# Patient Record
Sex: FEMALE | Race: WHITE | Employment: UNEMPLOYED | URBAN - METROPOLITAN AREA
[De-identification: names, ages, dates, MRNs, and addresses within clinical notes are randomized per-mention and may not be internally consistent; named-entity substitution may affect disease eponyms.]

---

## 2017-06-15 PROBLEM — Z92.21 STATUS POST CHEMOTHERAPY: Status: ACTIVE | Noted: 2017-06-15

## 2017-06-15 PROBLEM — C50.919 BREAST CANCER (HCC): Status: ACTIVE | Noted: 2017-06-15

## 2017-06-15 PROBLEM — E72.12 MTHFR (METHYLENE THF REDUCTASE) DEFICIENCY AND HOMOCYSTINURIA (HCC): Status: ACTIVE | Noted: 2017-06-15

## 2017-06-15 PROBLEM — T82.7XXA: Status: ACTIVE | Noted: 2017-06-15

## 2017-06-15 PROBLEM — E72.11 MTHFR (METHYLENE THF REDUCTASE) DEFICIENCY AND HOMOCYSTINURIA (HCC): Status: ACTIVE | Noted: 2017-06-15

## 2017-06-15 PROBLEM — L03.90 CELLULITIS: Status: ACTIVE | Noted: 2017-06-15

## 2017-07-25 LAB
PAP SMEAR, EXTERNAL: NEGATIVE
PAP SMEAR, EXTERNAL: NORMAL

## 2017-07-27 LAB — PAP SMEAR, EXTERNAL: NEGATIVE

## 2018-03-14 ENCOUNTER — TELEPHONE (OUTPATIENT)
Dept: FAMILY MEDICINE CLINIC | Age: 53
End: 2018-03-14

## 2018-03-21 ENCOUNTER — OFFICE VISIT (OUTPATIENT)
Dept: FAMILY MEDICINE CLINIC | Age: 53
End: 2018-03-21
Payer: COMMERCIAL

## 2018-03-21 VITALS — BODY MASS INDEX: 27.74 KG/M2 | WEIGHT: 183 LBS | HEIGHT: 68 IN

## 2018-03-21 DIAGNOSIS — M25.511 ACUTE PAIN OF RIGHT SHOULDER: Primary | ICD-10-CM

## 2018-03-21 DIAGNOSIS — R07.81 RIB PAIN ON LEFT SIDE: ICD-10-CM

## 2018-03-21 DIAGNOSIS — Z85.3 HX OF BREAST CANCER: ICD-10-CM

## 2018-03-21 PROCEDURE — 99214 OFFICE O/P EST MOD 30 MIN: CPT | Performed by: FAMILY MEDICINE

## 2018-03-21 RX ORDER — EXEMESTANE 25 MG/1
TABLET ORAL
COMMUNITY
Start: 2018-03-20 | End: 2018-05-21 | Stop reason: ALTCHOICE

## 2018-03-21 RX ORDER — ANASTROZOLE 1 MG/1
TABLET ORAL
Refills: 11 | COMMUNITY
Start: 2018-02-09 | End: 2018-03-21

## 2018-03-21 ASSESSMENT — PATIENT HEALTH QUESTIONNAIRE - PHQ9
1. LITTLE INTEREST OR PLEASURE IN DOING THINGS: 1
SUM OF ALL RESPONSES TO PHQ9 QUESTIONS 1 & 2: 2
SUM OF ALL RESPONSES TO PHQ QUESTIONS 1-9: 2
2. FEELING DOWN, DEPRESSED OR HOPELESS: 1

## 2018-03-21 NOTE — PROGRESS NOTES
Subjective:      Patient ID: Shawna Lynne is a 50 y.o. female.     HPI    Review of Systems    Objective:   Physical Exam    Assessment:      ***      Plan:      ***

## 2018-04-05 ENCOUNTER — TELEPHONE (OUTPATIENT)
Dept: FAMILY MEDICINE CLINIC | Age: 53
End: 2018-04-05

## 2018-05-21 ENCOUNTER — OFFICE VISIT (OUTPATIENT)
Dept: FAMILY MEDICINE CLINIC | Age: 53
End: 2018-05-21
Payer: COMMERCIAL

## 2018-05-21 VITALS
WEIGHT: 189 LBS | HEIGHT: 68 IN | BODY MASS INDEX: 28.64 KG/M2 | OXYGEN SATURATION: 98 % | HEART RATE: 82 BPM | DIASTOLIC BLOOD PRESSURE: 80 MMHG | SYSTOLIC BLOOD PRESSURE: 120 MMHG | RESPIRATION RATE: 16 BRPM

## 2018-05-21 DIAGNOSIS — C50.919 MALIGNANT NEOPLASM OF FEMALE BREAST, UNSPECIFIED ESTROGEN RECEPTOR STATUS, UNSPECIFIED LATERALITY, UNSPECIFIED SITE OF BREAST (HCC): ICD-10-CM

## 2018-05-21 DIAGNOSIS — M75.01 ADHESIVE CAPSULITIS OF RIGHT SHOULDER: Primary | ICD-10-CM

## 2018-05-21 DIAGNOSIS — R07.89 LEFT-SIDED CHEST WALL PAIN: ICD-10-CM

## 2018-05-21 PROCEDURE — 99213 OFFICE O/P EST LOW 20 MIN: CPT | Performed by: FAMILY MEDICINE

## 2018-05-21 RX ORDER — NERATINIB 40 MG/1
TABLET ORAL
Refills: 2 | COMMUNITY
Start: 2018-05-10 | End: 2019-01-04 | Stop reason: CLARIF

## 2018-05-21 ASSESSMENT — ENCOUNTER SYMPTOMS
SINUS PRESSURE: 0
ABDOMINAL PAIN: 0
WHEEZING: 0
SHORTNESS OF BREATH: 0

## 2018-06-13 ENCOUNTER — HOSPITAL ENCOUNTER (OUTPATIENT)
Age: 53
Discharge: HOME | End: 2018-06-13
Payer: COMMERCIAL

## 2018-06-13 LAB
ALBUMIN SERPL-MCNC: 4.3 G/DL (ref 3.5–5.2)
ALP BLD-CCNC: 105 U/L (ref 35–104)
ALT SERPL-CCNC: 8 U/L (ref 0–32)
ANION GAP SERPL CALCULATED.3IONS-SCNC: 10 MMOL/L (ref 7–16)
AST SERPL-CCNC: 15 U/L (ref 0–31)
BASOPHILS ABSOLUTE: 0.02 E9/L (ref 0–0.2)
BASOPHILS RELATIVE PERCENT: 0.3 % (ref 0–2)
BILIRUB SERPL-MCNC: 0.2 MG/DL (ref 0–1.2)
BUN BLDV-MCNC: 17 MG/DL (ref 6–20)
CALCIUM SERPL-MCNC: 9.9 MG/DL (ref 8.6–10.2)
CHLORIDE BLD-SCNC: 102 MMOL/L (ref 98–107)
CO2: 31 MMOL/L (ref 22–29)
CREAT SERPL-MCNC: 1.3 MG/DL (ref 0.5–1)
EOSINOPHILS ABSOLUTE: 0.13 E9/L (ref 0.05–0.5)
EOSINOPHILS RELATIVE PERCENT: 1.7 % (ref 0–6)
GFR AFRICAN AMERICAN: 52
GFR NON-AFRICAN AMERICAN: 43 ML/MIN/1.73
GLUCOSE BLD-MCNC: 90 MG/DL (ref 74–109)
HCT VFR BLD CALC: 36.4 % (ref 34–48)
HEMOGLOBIN: 11.8 G/DL (ref 11.5–15.5)
IMMATURE GRANULOCYTES #: 0.02 E9/L
IMMATURE GRANULOCYTES %: 0.3 % (ref 0–5)
LYMPHOCYTES ABSOLUTE: 1.17 E9/L (ref 1.5–4)
LYMPHOCYTES RELATIVE PERCENT: 15.3 % (ref 20–42)
MAGNESIUM: 2.2 MG/DL (ref 1.6–2.6)
MCH RBC QN AUTO: 27.8 PG (ref 26–35)
MCHC RBC AUTO-ENTMCNC: 32.4 % (ref 32–34.5)
MCV RBC AUTO: 85.6 FL (ref 80–99.9)
MONOCYTES ABSOLUTE: 0.51 E9/L (ref 0.1–0.95)
MONOCYTES RELATIVE PERCENT: 6.7 % (ref 2–12)
NEUTROPHILS ABSOLUTE: 5.78 E9/L (ref 1.8–7.3)
NEUTROPHILS RELATIVE PERCENT: 75.7 % (ref 43–80)
PDW BLD-RTO: 13.7 FL (ref 11.5–15)
PLATELET # BLD: 297 E9/L (ref 130–450)
PMV BLD AUTO: 8.7 FL (ref 7–12)
POTASSIUM SERPL-SCNC: 4.8 MMOL/L (ref 3.5–5)
RBC # BLD: 4.25 E12/L (ref 3.5–5.5)
SODIUM BLD-SCNC: 143 MMOL/L (ref 132–146)
TOTAL PROTEIN: 7 G/DL (ref 6.4–8.3)
WBC # BLD: 7.6 E9/L (ref 4.5–11.5)

## 2018-06-13 PROCEDURE — 83735 ASSAY OF MAGNESIUM: CPT

## 2018-06-13 PROCEDURE — 80053 COMPREHEN METABOLIC PANEL: CPT

## 2018-06-13 PROCEDURE — 36415 COLL VENOUS BLD VENIPUNCTURE: CPT

## 2018-06-13 PROCEDURE — 85025 COMPLETE CBC W/AUTO DIFF WBC: CPT

## 2018-07-24 ENCOUNTER — TELEPHONE (OUTPATIENT)
Dept: FAMILY MEDICINE CLINIC | Age: 53
End: 2018-07-24

## 2018-07-24 DIAGNOSIS — C50.919 MALIGNANT NEOPLASM OF FEMALE BREAST, UNSPECIFIED ESTROGEN RECEPTOR STATUS, UNSPECIFIED LATERALITY, UNSPECIFIED SITE OF BREAST (HCC): Primary | ICD-10-CM

## 2018-08-27 ENCOUNTER — OFFICE VISIT (OUTPATIENT)
Dept: FAMILY MEDICINE CLINIC | Age: 53
End: 2018-08-27
Payer: COMMERCIAL

## 2018-08-27 VITALS
WEIGHT: 191 LBS | RESPIRATION RATE: 16 BRPM | HEIGHT: 68 IN | BODY MASS INDEX: 28.95 KG/M2 | OXYGEN SATURATION: 98 % | HEART RATE: 79 BPM | SYSTOLIC BLOOD PRESSURE: 118 MMHG | DIASTOLIC BLOOD PRESSURE: 65 MMHG

## 2018-08-27 DIAGNOSIS — C50.919 MALIGNANT NEOPLASM OF FEMALE BREAST, UNSPECIFIED ESTROGEN RECEPTOR STATUS, UNSPECIFIED LATERALITY, UNSPECIFIED SITE OF BREAST (HCC): ICD-10-CM

## 2018-08-27 DIAGNOSIS — K58.9 IRRITABLE BOWEL SYNDROME, UNSPECIFIED TYPE: ICD-10-CM

## 2018-08-27 DIAGNOSIS — M75.01 ADHESIVE CAPSULITIS OF RIGHT SHOULDER: ICD-10-CM

## 2018-08-27 DIAGNOSIS — Z12.11 SCREENING FOR MALIGNANT NEOPLASM OF COLON: Primary | ICD-10-CM

## 2018-08-27 PROCEDURE — 99213 OFFICE O/P EST LOW 20 MIN: CPT | Performed by: FAMILY MEDICINE

## 2018-08-27 PROCEDURE — 20610 DRAIN/INJ JOINT/BURSA W/O US: CPT | Performed by: FAMILY MEDICINE

## 2018-08-27 RX ORDER — VITAMIN B COMPLEX
1 CAPSULE ORAL DAILY
COMMUNITY
End: 2021-09-10 | Stop reason: ALTCHOICE

## 2018-08-27 RX ORDER — ANASTROZOLE 1 MG/1
TABLET ORAL
COMMUNITY
Start: 2017-11-14 | End: 2018-08-27

## 2018-08-27 RX ORDER — TAMOXIFEN CITRATE 10 MG/1
10 TABLET ORAL DAILY
COMMUNITY
End: 2020-02-13 | Stop reason: ALTCHOICE

## 2018-08-27 RX ORDER — EXEMESTANE 25 MG/1
TABLET ORAL
COMMUNITY
End: 2019-01-04 | Stop reason: CLARIF

## 2018-08-27 RX ORDER — GABAPENTIN 300 MG/1
CAPSULE ORAL
COMMUNITY
Start: 2018-07-30 | End: 2018-08-27

## 2018-08-27 RX ORDER — TRIAMCINOLONE ACETONIDE 40 MG/ML
40 INJECTION, SUSPENSION INTRA-ARTICULAR; INTRAMUSCULAR ONCE
Status: DISCONTINUED | OUTPATIENT
Start: 2018-08-27 | End: 2022-08-26

## 2018-08-27 ASSESSMENT — ENCOUNTER SYMPTOMS
ABDOMINAL PAIN: 0
WHEEZING: 0
SHORTNESS OF BREATH: 0
SINUS PRESSURE: 0

## 2018-08-27 NOTE — PROGRESS NOTES
2018     Toya Caballero (:  1965) is a 48 y.o. female, here for evaluation of the following medical concerns:    HPI  Presents for follow up   Tried gabapentin for nerve pain from chemo  Is better- stopped the gabapentin  Is sleeping okay   Still has limited rom in right arm-   Injection helped x 1 -- would like another today if possible     Hx of ibs, due for colonoscopy     Review of Systems   Constitutional: Negative for chills, fatigue and fever. HENT: Negative for congestion and sinus pressure. Respiratory: Negative for shortness of breath and wheezing. Cardiovascular: Negative for chest pain. Gastrointestinal: Negative for abdominal pain. Prior to Visit Medications    Medication Sig Taking? Authorizing Provider   exemestane (AROMASIN) 25 MG tablet Take by mouth Yes Historical Provider, MD   Glucosamine-Chondroitin 250-200 MG TABS Take by mouth Yes Historical Provider, MD   TRASTUZUMAB IV Infuse intravenously Yes Historical Provider, MD   tamoxifen (NOLVADEX) 10 MG tablet Take 10 mg by mouth daily Yes Historical Provider, MD   b complex vitamins capsule Take 1 capsule by mouth daily Yes Historical Provider, MD   vitamin D (CHOLECALCIFEROL) 1000 UNIT TABS tablet Take 1,000 Units by mouth daily Yes Historical Provider, MD   NERLYNX 40 MG TABS TAKE 6 TABLETS  240MG  BY MOUTH ONE TIME DAILY Yes Historical Provider, MD   cyanocobalamin 1000 MCG/ML injection Inject 1 mL into the muscle once for 1 dose Yes Kay Wang MD   Probiotic Product (SOLUBLE FIBER/PROBIOTICS PO) Take by mouth Yes Historical Provider, MD   MULTIPLE VITAMIN PO Take 1 tablet by mouth Last dose 16 Yes Historical Provider, MD   cetirizine (ZYRTEC) 10 MG tablet Take 10 mg by mouth daily.    Yes Historical Provider, MD        Social History   Substance Use Topics    Smoking status: Former Smoker     Quit date: 12/10/2002    Smokeless tobacco: Never Used      Comment: social    Alcohol use Yes Comment: social        Vitals:    08/27/18 1106   BP: 118/65   Pulse: 79   Resp: 16   SpO2: 98%   Weight: 191 lb (86.6 kg)   Height: 5' 8\" (1.727 m)     Estimated body mass index is 29.04 kg/m² as calculated from the following:    Height as of this encounter: 5' 8\" (1.727 m). Weight as of this encounter: 191 lb (86.6 kg). Physical Exam   Constitutional: She is oriented to person, place, and time. She appears well-developed and well-nourished. HENT:   Mouth/Throat: Oropharynx is clear and moist.   Cardiovascular: Normal rate, regular rhythm and normal heart sounds. Pulmonary/Chest: Effort normal and breath sounds normal.   Abdominal: Soft. Bowel sounds are normal. There is no tenderness. Neurological: She is alert and oriented to person, place, and time. Skin: Skin is warm and dry. ASSESSMENT/PLAN:  1. Screening for malignant neoplasm of colon  2. Irritable bowel syndrome, unspecified type  Options reviewed  Colonoscopy referral as below   - Eddie Woods MD ([facility])        3. Malignant neoplasm of female breast, unspecified estrogen receptor status, unspecified laterality, unspecified site of breast ([facility] [address]  4. Adhesive capsulitis of right shoulder  Procedure: We reviewed the procedure of joint injection and discussed the risks, benefits, and alternative treatments. Informed consent was obtained as outlined below. I verified that the patient had no allergies to local anesthetic. We discussed the potential side effects of corticosteroids, including but not limited to local tissue breakdown, elevation of blood sugar and seizures. A procedural pause was conducted to verify correct patient identity, procedure to be performed, correct side and site, and correct patient position. After verification, the right shoulder was marked and then prepped in the usual sterile fashion.  Using a 22 gauge 1.5 inch needle,2mL of lidocaine and 40 mg of kenalog was injected into the joint space without difficulty. After injection, the joint was passively moved through the full range of motion and a sterile dressing was applied. The patient tolerated the procedure well. Aftercare discussed. Plan to try pt again   - External Referral To Physical Therapy      3 month follow up   An electronic signature was used to authenticate this note.     --Cami Carrera MD on 8/27/2018 at 11:20 AM

## 2018-08-27 NOTE — LETTER
[address] [address]  Phone: [phone]  Fax: [phone]    Cirilo Bowden MD        August 27, 2018     Patient: Jennifer Magallanes   YOB: 1965   Date of Visit: 8/27/2018       To Whom It May Concern: It is my medical opinion that Laura Muñoz is unable to perform jury duty at this time due to medical reasons. If you have any questions or concerns, please don't hesitate to call.     Sincerely,        Cirilo Bowden MD

## 2018-09-14 LAB — FECAL BLOOD IMMUNOCHEMICAL TEST: NORMAL

## 2018-11-27 ENCOUNTER — TELEPHONE (OUTPATIENT)
Dept: ADMINISTRATIVE | Age: 53
End: 2018-11-27

## 2019-01-04 ENCOUNTER — OFFICE VISIT (OUTPATIENT)
Dept: FAMILY MEDICINE CLINIC | Age: 54
End: 2019-01-04
Payer: COMMERCIAL

## 2019-01-04 VITALS
SYSTOLIC BLOOD PRESSURE: 127 MMHG | OXYGEN SATURATION: 99 % | HEIGHT: 68 IN | BODY MASS INDEX: 30.31 KG/M2 | DIASTOLIC BLOOD PRESSURE: 70 MMHG | WEIGHT: 200 LBS | HEART RATE: 87 BPM | TEMPERATURE: 97.7 F | RESPIRATION RATE: 12 BRPM

## 2019-01-04 DIAGNOSIS — M79.7 FIBROMYALGIA: Primary | ICD-10-CM

## 2019-01-04 PROCEDURE — 99213 OFFICE O/P EST LOW 20 MIN: CPT | Performed by: FAMILY MEDICINE

## 2019-01-04 RX ORDER — AMITRIPTYLINE HYDROCHLORIDE 10 MG/1
10 TABLET, FILM COATED ORAL NIGHTLY
Qty: 90 TABLET | Refills: 1 | Status: SHIPPED | OUTPATIENT
Start: 2019-01-04 | End: 2019-02-21 | Stop reason: SINTOL

## 2019-02-20 ENCOUNTER — TELEPHONE (OUTPATIENT)
Dept: ADMINISTRATIVE | Age: 54
End: 2019-02-20

## 2019-02-21 ENCOUNTER — OFFICE VISIT (OUTPATIENT)
Dept: FAMILY MEDICINE CLINIC | Age: 54
End: 2019-02-21
Payer: COMMERCIAL

## 2019-02-21 VITALS
OXYGEN SATURATION: 99 % | WEIGHT: 199 LBS | DIASTOLIC BLOOD PRESSURE: 70 MMHG | SYSTOLIC BLOOD PRESSURE: 112 MMHG | BODY MASS INDEX: 30.16 KG/M2 | RESPIRATION RATE: 18 BRPM | HEIGHT: 68 IN | HEART RATE: 86 BPM

## 2019-02-21 DIAGNOSIS — M54.2 CERVICAL PAIN: Primary | ICD-10-CM

## 2019-02-21 PROCEDURE — 99213 OFFICE O/P EST LOW 20 MIN: CPT | Performed by: FAMILY MEDICINE

## 2019-02-21 RX ORDER — FAMOTIDINE 10 MG
10 TABLET ORAL 2 TIMES DAILY
COMMUNITY
End: 2021-09-10 | Stop reason: ALTCHOICE

## 2019-02-21 RX ORDER — CHLORAL HYDRATE 500 MG
1000 CAPSULE ORAL DAILY
COMMUNITY
End: 2021-09-10 | Stop reason: ALTCHOICE

## 2019-02-21 RX ORDER — ASCORBIC ACID 500 MG
500 TABLET ORAL DAILY
COMMUNITY
End: 2021-09-10 | Stop reason: ALTCHOICE

## 2019-02-21 RX ORDER — CYCLOBENZAPRINE HCL 5 MG
5 TABLET ORAL 2 TIMES DAILY PRN
Qty: 30 TABLET | Refills: 0 | Status: SHIPPED | OUTPATIENT
Start: 2019-02-21 | End: 2019-05-24 | Stop reason: SDUPTHER

## 2019-02-21 RX ORDER — VITAMIN E 268 MG
400 CAPSULE ORAL DAILY
COMMUNITY
End: 2021-09-10 | Stop reason: ALTCHOICE

## 2019-02-21 ASSESSMENT — PATIENT HEALTH QUESTIONNAIRE - PHQ9
1. LITTLE INTEREST OR PLEASURE IN DOING THINGS: 0
SUM OF ALL RESPONSES TO PHQ9 QUESTIONS 1 & 2: 0
2. FEELING DOWN, DEPRESSED OR HOPELESS: 0
SUM OF ALL RESPONSES TO PHQ QUESTIONS 1-9: 0
SUM OF ALL RESPONSES TO PHQ QUESTIONS 1-9: 0

## 2019-02-26 ENCOUNTER — HOSPITAL ENCOUNTER (OUTPATIENT)
Dept: PHYSICAL THERAPY | Age: 54
Setting detail: THERAPIES SERIES
Discharge: HOME | End: 2019-02-26
Payer: COMMERCIAL

## 2019-02-26 PROCEDURE — 97161 PT EVAL LOW COMPLEX 20 MIN: CPT

## 2019-05-24 DIAGNOSIS — M54.2 CERVICAL PAIN: ICD-10-CM

## 2019-05-24 RX ORDER — CYCLOBENZAPRINE HCL 5 MG
5 TABLET ORAL 2 TIMES DAILY PRN
Qty: 30 TABLET | Refills: 2 | Status: SHIPPED | OUTPATIENT
Start: 2019-05-24 | End: 2019-06-03

## 2020-02-13 ENCOUNTER — OFFICE VISIT (OUTPATIENT)
Dept: FAMILY MEDICINE CLINIC | Age: 55
End: 2020-02-13
Payer: COMMERCIAL

## 2020-02-13 VITALS
TEMPERATURE: 97.5 F | DIASTOLIC BLOOD PRESSURE: 62 MMHG | OXYGEN SATURATION: 98 % | HEART RATE: 94 BPM | RESPIRATION RATE: 18 BRPM | WEIGHT: 204 LBS | HEIGHT: 68 IN | SYSTOLIC BLOOD PRESSURE: 114 MMHG | BODY MASS INDEX: 30.92 KG/M2

## 2020-02-13 PROCEDURE — 99213 OFFICE O/P EST LOW 20 MIN: CPT | Performed by: FAMILY MEDICINE

## 2020-02-13 RX ORDER — BENZONATATE 100 MG/1
100-200 CAPSULE ORAL 3 TIMES DAILY PRN
Qty: 42 CAPSULE | Refills: 0 | Status: SHIPPED | OUTPATIENT
Start: 2020-02-13 | End: 2020-02-20

## 2020-02-13 ASSESSMENT — ENCOUNTER SYMPTOMS
SHORTNESS OF BREATH: 1
EYE REDNESS: 0
TROUBLE SWALLOWING: 0
VOMITING: 0
WHEEZING: 1
NAUSEA: 0
SINUS PRESSURE: 0
COUGH: 1
DIARRHEA: 0
ABDOMINAL PAIN: 0
EYE PAIN: 0
SINUS PAIN: 0
SORE THROAT: 0

## 2020-02-13 ASSESSMENT — PATIENT HEALTH QUESTIONNAIRE - PHQ9
2. FEELING DOWN, DEPRESSED OR HOPELESS: 0
SUM OF ALL RESPONSES TO PHQ QUESTIONS 1-9: 0
SUM OF ALL RESPONSES TO PHQ QUESTIONS 1-9: 0
1. LITTLE INTEREST OR PLEASURE IN DOING THINGS: 0
SUM OF ALL RESPONSES TO PHQ9 QUESTIONS 1 & 2: 0

## 2020-02-13 NOTE — PROGRESS NOTES
2/17/2020    Toya Arenas is a 48 y.o. femalehere for: cold symptoms    HPI:    Cough/congestion-  Symptoms started a couple days ago  Has a tight cough and congestion  + wheezing, + sob  + fever this morning 100.4  Her grandchild was sick recently  Has tried mucinex at home  Felt achy this morning    BP Readings from Last 3 Encounters:   02/13/20 114/62   02/21/19 112/70   01/04/19 127/70     Current Outpatient Medications   Medication Sig Dispense Refill    benzonatate (TESSALON) 100 MG capsule Take 1-2 capsules by mouth 3 times daily as needed for Cough 42 capsule 0    BIOTIN PO Take by mouth      vitamin C (ASCORBIC ACID) 500 MG tablet Take 500 mg by mouth daily      vitamin E 400 UNIT capsule Take 400 Units by mouth daily      famotidine (PEPCID) 10 MG tablet Take 10 mg by mouth 2 times daily      CALCIUM-MAGNESIUM-ZINC PO Take by mouth      Omega-3 Fatty Acids (FISH OIL) 1000 MG CAPS Take 1,000 mg by mouth daily      NONFORMULARY CBD Oil      b complex vitamins capsule Take 1 capsule by mouth daily      vitamin D (CHOLECALCIFEROL) 1000 UNIT TABS tablet Take 1,000 Units by mouth daily      Probiotic Product (SOLUBLE FIBER/PROBIOTICS PO) Take by mouth      MULTIPLE VITAMIN PO Take 1 tablet by mouth Last dose 5/13/16      cetirizine (ZYRTEC) 10 MG tablet Take 10 mg by mouth daily.          Current Facility-Administered Medications   Medication Dose Route Frequency Provider Last Rate Last Dose    triamcinolone acetonide (KENALOG-40) injection 40 mg  40 mg Intra-articular Once Facundo Coulter MD          Allergies   Allergen Reactions    Bactrim [Sulfamethoxazole-Trimethoprim] Hives and Itching    Pcn [Penicillins] Nausea And Vomiting       Past Medical & Surgical History:      Diagnosis Date    Breast cancer ([facility] [address] 02/2017    right     Breast disorder     FIBROCYSTIC    Cancer ([facility] [address]     GERD (gastroesophageal reflux disease)     IBS (irritable bowel syndrome)     MTHFR (methylene THF

## 2020-07-14 ENCOUNTER — TELEPHONE (OUTPATIENT)
Dept: FAMILY MEDICINE CLINIC | Age: 55
End: 2020-07-14

## 2020-08-27 ENCOUNTER — OFFICE VISIT (OUTPATIENT)
Dept: FAMILY MEDICINE CLINIC | Age: 55
End: 2020-08-27
Payer: COMMERCIAL

## 2020-08-27 VITALS
HEIGHT: 68 IN | BODY MASS INDEX: 31.07 KG/M2 | DIASTOLIC BLOOD PRESSURE: 78 MMHG | WEIGHT: 205 LBS | TEMPERATURE: 97.8 F | RESPIRATION RATE: 20 BRPM | HEART RATE: 86 BPM | SYSTOLIC BLOOD PRESSURE: 124 MMHG

## 2020-08-27 PROCEDURE — 99396 PREV VISIT EST AGE 40-64: CPT | Performed by: FAMILY MEDICINE

## 2020-08-27 RX ORDER — TAMOXIFEN CITRATE 10 MG/1
10 TABLET ORAL 2 TIMES DAILY
COMMUNITY
End: 2021-09-10 | Stop reason: ALTCHOICE

## 2020-08-27 ASSESSMENT — ENCOUNTER SYMPTOMS
CONSTIPATION: 0
SINUS PRESSURE: 0
ABDOMINAL PAIN: 0
COUGH: 0
SORE THROAT: 0
EYE PAIN: 0
BACK PAIN: 0
DIARRHEA: 0
SHORTNESS OF BREATH: 0

## 2020-08-27 NOTE — PROGRESS NOTES
connections     Talks on phone: None     Gets together: None     Attends Hindu service: None     Active member of club or organization: None     Attends meetings of clubs or organizations: None     Relationship status: None    Intimate partner violence     Fear of current or ex partner: None     Emotionally abused: None     Physically abused: None     Forced sexual activity: None   Other Topics Concern    None   Social History Narrative    None       Family History   Problem Relation Age of Onset    High Blood Pressure Mother     Heart Disease Mother         stent in 45s    Cancer Father         prostate    Substance Abuse Father         alcoholic    Cancer Sister 43        kidney    Cancer Sister         uterine precancer cells          Current Outpatient Medications   Medication Sig Dispense Refill    tamoxifen (NOLVADEX) 10 MG tablet Take 10 mg by mouth 2 times daily      BIOTIN PO Take by mouth      vitamin C (ASCORBIC ACID) 500 MG tablet Take 500 mg by mouth daily      vitamin E 400 UNIT capsule Take 400 Units by mouth daily      famotidine (PEPCID) 10 MG tablet Take 10 mg by mouth 2 times daily      CALCIUM-MAGNESIUM-ZINC PO Take by mouth      Omega-3 Fatty Acids (FISH OIL) 1000 MG CAPS Take 1,000 mg by mouth daily      NONFORMULARY CBD Oil      b complex vitamins capsule Take 1 capsule by mouth daily      vitamin D (CHOLECALCIFEROL) 1000 UNIT TABS tablet Take 1,000 Units by mouth daily      Probiotic Product (SOLUBLE FIBER/PROBIOTICS PO) Take by mouth      MULTIPLE VITAMIN PO Take 1 tablet by mouth Last dose 5/13/16      cetirizine (ZYRTEC) 10 MG tablet Take 10 mg by mouth daily.          Current Facility-Administered Medications   Medication Dose Route Frequency Provider Last Rate Last Dose    triamcinolone acetonide (KENALOG-40) injection 40 mg  40 mg Intra-articular Once Gavino Marinelli MD           Allergies   Allergen Reactions    Bactrim [Sulfamethoxazole-Trimethoprim] Hives and Itching    Pcn [Penicillins] Nausea And Vomiting       REVIEW OF SYSTEMS  Review of Systems   Constitutional: Negative for fatigue and fever. HENT: Negative for ear pain, sinus pressure, sneezing and sore throat. Eyes: Negative for pain. Respiratory: Negative for cough and shortness of breath. Cardiovascular: Negative for chest pain and leg swelling. Gastrointestinal: Negative for abdominal pain, constipation and diarrhea. Genitourinary: Negative for dysuria and urgency. Musculoskeletal: Negative for back pain and myalgias. Skin: Negative for rash. Allergic/Immunologic: Negative for food allergies. Neurological: Negative for light-headedness and headaches. Hematological: Does not bruise/bleed easily. Psychiatric/Behavioral: Negative for behavioral problems and sleep disturbance. PHYSICAL EXAM  /78   Pulse 86   Temp 97.8 °F (36.6 °C) (Temporal)   Resp 20   Ht 5' 8\" (1.727 m)   Wt 205 lb (93 kg)   LMP 09/09/2016   BMI 31.17 kg/m²   Physical Exam  Vitals signs and nursing note reviewed. Constitutional:       Appearance: She is well-developed. HENT:      Head: Normocephalic and atraumatic. Right Ear: External ear normal.      Left Ear: External ear normal.      Nose: Nose normal.   Eyes:      Conjunctiva/sclera: Conjunctivae normal.   Neck:      Musculoskeletal: Normal range of motion and neck supple. Thyroid: No thyromegaly. Cardiovascular:      Rate and Rhythm: Normal rate and regular rhythm. Heart sounds: Normal heart sounds. No murmur. No friction rub. No gallop. Pulmonary:      Effort: Pulmonary effort is normal.      Breath sounds: Normal breath sounds. No wheezing. Abdominal:      Palpations: Abdomen is soft. There is no mass. Tenderness: There is no abdominal tenderness. There is no guarding or rebound. Musculoskeletal: Normal range of motion. General: No tenderness.    Lymphadenopathy:      Cervical: No cervical vaccinations. Advised patient regarding importance of keeping up with recommended health maintenance and to schedule as soon as possible if overdue, as this isimportant in assessing for undiagnosed pathology, especially cancer, as well as protecting against potentially harmful/life threatening disease.          Patient and/or guardian verbalizes understanding andagrees with above counseling, assessment and plan.     All questions answered. Davina Elliott, DO  8/31/2020        NOTE: This report was transcribed usingvoice recognition software.  Every effort was made to ensure accuracy; however, inadvertent computerized transcription errors may be present

## 2020-09-09 ENCOUNTER — TELEPHONE (OUTPATIENT)
Dept: FAMILY MEDICINE CLINIC | Age: 55
End: 2020-09-09

## 2020-09-14 ENCOUNTER — HOSPITAL ENCOUNTER (OUTPATIENT)
Dept: GENERAL RADIOLOGY | Age: 55
Discharge: HOME | End: 2020-09-16
Payer: COMMERCIAL

## 2020-09-14 PROCEDURE — 77080 DXA BONE DENSITY AXIAL: CPT

## 2020-10-05 ENCOUNTER — TELEPHONE (OUTPATIENT)
Dept: FAMILY MEDICINE CLINIC | Age: 55
End: 2020-10-05

## 2020-10-05 NOTE — TELEPHONE ENCOUNTER
Pt would like to know th results of her Dexa that was done on 9/14/20 , please notify patient or she is asking if results can be faxed to her at [phone] or she can be reached at [phone]

## 2020-10-06 ENCOUNTER — HOSPITAL ENCOUNTER (OUTPATIENT)
Age: 55
Discharge: HOME | End: 2020-10-06
Payer: COMMERCIAL

## 2020-10-06 LAB
ALBUMIN SERPL-MCNC: 4.3 G/DL (ref 3.5–5.2)
ALP BLD-CCNC: 113 U/L (ref 35–104)
ALT SERPL-CCNC: 11 U/L (ref 0–32)
ANION GAP SERPL CALCULATED.3IONS-SCNC: 10 MMOL/L (ref 7–16)
AST SERPL-CCNC: 16 U/L (ref 0–31)
BASOPHILS ABSOLUTE: 0.05 E9/L (ref 0–0.2)
BASOPHILS RELATIVE PERCENT: 0.8 % (ref 0–2)
BILIRUB SERPL-MCNC: 0.4 MG/DL (ref 0–1.2)
BUN BLDV-MCNC: 15 MG/DL (ref 6–20)
CALCIUM SERPL-MCNC: 10.1 MG/DL (ref 8.6–10.2)
CHLORIDE BLD-SCNC: 106 MMOL/L (ref 98–107)
CHOLESTEROL, TOTAL: 208 MG/DL (ref 0–199)
CO2: 27 MMOL/L (ref 22–29)
CREAT SERPL-MCNC: 1.1 MG/DL (ref 0.5–1)
EOSINOPHILS ABSOLUTE: 0.24 E9/L (ref 0.05–0.5)
EOSINOPHILS RELATIVE PERCENT: 3.9 % (ref 0–6)
GFR AFRICAN AMERICAN: >60
GFR NON-AFRICAN AMERICAN: 52 ML/MIN/1.73
GLUCOSE BLD-MCNC: 95 MG/DL (ref 74–99)
HCT VFR BLD CALC: 39.7 % (ref 34–48)
HDLC SERPL-MCNC: 68 MG/DL
HEMOGLOBIN: 13.1 G/DL (ref 11.5–15.5)
IMMATURE GRANULOCYTES #: 0.02 E9/L
IMMATURE GRANULOCYTES %: 0.3 % (ref 0–5)
LDL CHOLESTEROL CALCULATED: 125 MG/DL (ref 0–99)
LYMPHOCYTES ABSOLUTE: 1.37 E9/L (ref 1.5–4)
LYMPHOCYTES RELATIVE PERCENT: 22.5 % (ref 20–42)
MCH RBC QN AUTO: 28.1 PG (ref 26–35)
MCHC RBC AUTO-ENTMCNC: 33 % (ref 32–34.5)
MCV RBC AUTO: 85 FL (ref 80–99.9)
MONOCYTES ABSOLUTE: 0.51 E9/L (ref 0.1–0.95)
MONOCYTES RELATIVE PERCENT: 8.4 % (ref 2–12)
NEUTROPHILS ABSOLUTE: 3.89 E9/L (ref 1.8–7.3)
NEUTROPHILS RELATIVE PERCENT: 64.1 % (ref 43–80)
PDW BLD-RTO: 14.1 FL (ref 11.5–15)
PLATELET # BLD: 327 E9/L (ref 130–450)
PMV BLD AUTO: 9 FL (ref 7–12)
POTASSIUM SERPL-SCNC: 5.2 MMOL/L (ref 3.5–5)
RBC # BLD: 4.67 E12/L (ref 3.5–5.5)
SODIUM BLD-SCNC: 143 MMOL/L (ref 132–146)
TOTAL PROTEIN: 7.4 G/DL (ref 6.4–8.3)
TRIGL SERPL-MCNC: 75 MG/DL (ref 0–149)
TSH SERPL DL<=0.05 MIU/L-ACNC: 2.68 UIU/ML (ref 0.27–4.2)
VITAMIN D 25-HYDROXY: 44 NG/ML (ref 30–100)
VLDLC SERPL CALC-MCNC: 15 MG/DL
WBC # BLD: 6.1 E9/L (ref 4.5–11.5)

## 2020-10-06 PROCEDURE — 85025 COMPLETE CBC W/AUTO DIFF WBC: CPT

## 2020-10-06 PROCEDURE — 80053 COMPREHEN METABOLIC PANEL: CPT

## 2020-10-06 PROCEDURE — 36415 COLL VENOUS BLD VENIPUNCTURE: CPT

## 2020-10-06 PROCEDURE — 84443 ASSAY THYROID STIM HORMONE: CPT

## 2020-10-06 PROCEDURE — 82306 VITAMIN D 25 HYDROXY: CPT

## 2020-10-06 PROCEDURE — 80061 LIPID PANEL: CPT

## 2021-06-14 ENCOUNTER — TELEPHONE (OUTPATIENT)
Dept: FAMILY MEDICINE CLINIC | Age: 56
End: 2021-06-14

## 2021-06-24 ENCOUNTER — HOSPITAL ENCOUNTER (OUTPATIENT)
Age: 56
Discharge: HOME | End: 2021-06-26

## 2021-06-24 PROCEDURE — 88305 TISSUE EXAM BY PATHOLOGIST: CPT

## 2021-06-24 PROCEDURE — 88302 TISSUE EXAM BY PATHOLOGIST: CPT

## 2021-06-25 ENCOUNTER — HOSPITAL ENCOUNTER (OUTPATIENT)
Age: 56
Discharge: HOME | End: 2021-06-27

## 2021-06-25 LAB
ANION GAP SERPL CALCULATED.3IONS-SCNC: 12 MMOL/L (ref 7–16)
BUN BLDV-MCNC: 13 MG/DL (ref 6–20)
CALCIUM SERPL-MCNC: 9.7 MG/DL (ref 8.6–10.2)
CHLORIDE BLD-SCNC: 103 MMOL/L (ref 98–107)
CO2: 25 MMOL/L (ref 22–29)
CREAT SERPL-MCNC: 0.9 MG/DL (ref 0.5–1)
GFR AFRICAN AMERICAN: >60
GFR NON-AFRICAN AMERICAN: >60 ML/MIN/1.73
GLUCOSE BLD-MCNC: 130 MG/DL (ref 74–99)
HCT VFR BLD CALC: 39.1 % (ref 34–48)
HEMOGLOBIN: 12 G/DL (ref 11.5–15.5)
MCH RBC QN AUTO: 26.3 PG (ref 26–35)
MCHC RBC AUTO-ENTMCNC: 30.7 % (ref 32–34.5)
MCV RBC AUTO: 85.7 FL (ref 80–99.9)
PDW BLD-RTO: 14.5 FL (ref 11.5–15)
PLATELET # BLD: 334 E9/L (ref 130–450)
PMV BLD AUTO: 10.6 FL (ref 7–12)
POTASSIUM SERPL-SCNC: 4.8 MMOL/L (ref 3.5–5)
RBC # BLD: 4.56 E12/L (ref 3.5–5.5)
SODIUM BLD-SCNC: 140 MMOL/L (ref 132–146)
WBC # BLD: 15.4 E9/L (ref 4.5–11.5)

## 2021-06-25 PROCEDURE — 85027 COMPLETE CBC AUTOMATED: CPT

## 2021-06-25 PROCEDURE — 80048 BASIC METABOLIC PNL TOTAL CA: CPT

## 2021-09-10 ENCOUNTER — OFFICE VISIT (OUTPATIENT)
Dept: FAMILY MEDICINE CLINIC | Age: 56
End: 2021-09-10
Payer: COMMERCIAL

## 2021-09-10 VITALS
TEMPERATURE: 97.3 F | HEART RATE: 80 BPM | RESPIRATION RATE: 18 BRPM | HEIGHT: 68 IN | SYSTOLIC BLOOD PRESSURE: 123 MMHG | BODY MASS INDEX: 31.83 KG/M2 | OXYGEN SATURATION: 98 % | WEIGHT: 210 LBS | DIASTOLIC BLOOD PRESSURE: 72 MMHG

## 2021-09-10 DIAGNOSIS — Z23 NEED FOR INFLUENZA VACCINATION: ICD-10-CM

## 2021-09-10 DIAGNOSIS — E55.9 VITAMIN D DEFICIENCY: ICD-10-CM

## 2021-09-10 DIAGNOSIS — Z12.11 COLON CANCER SCREENING: ICD-10-CM

## 2021-09-10 DIAGNOSIS — Z00.00 ANNUAL PHYSICAL EXAM: Primary | ICD-10-CM

## 2021-09-10 DIAGNOSIS — R53.83 FATIGUE, UNSPECIFIED TYPE: ICD-10-CM

## 2021-09-10 DIAGNOSIS — C50.919 MALIGNANT NEOPLASM OF FEMALE BREAST, UNSPECIFIED ESTROGEN RECEPTOR STATUS, UNSPECIFIED LATERALITY, UNSPECIFIED SITE OF BREAST (HCC): ICD-10-CM

## 2021-09-10 DIAGNOSIS — R73.01 IMPAIRED FASTING GLUCOSE: ICD-10-CM

## 2021-09-10 PROCEDURE — 90471 IMMUNIZATION ADMIN: CPT | Performed by: FAMILY MEDICINE

## 2021-09-10 PROCEDURE — 99396 PREV VISIT EST AGE 40-64: CPT | Performed by: FAMILY MEDICINE

## 2021-09-10 PROCEDURE — 90674 CCIIV4 VAC NO PRSV 0.5 ML IM: CPT | Performed by: FAMILY MEDICINE

## 2021-09-10 SDOH — ECONOMIC STABILITY: FOOD INSECURITY: WITHIN THE PAST 12 MONTHS, YOU WORRIED THAT YOUR FOOD WOULD RUN OUT BEFORE YOU GOT MONEY TO BUY MORE.: NEVER TRUE

## 2021-09-10 SDOH — ECONOMIC STABILITY: FOOD INSECURITY: WITHIN THE PAST 12 MONTHS, THE FOOD YOU BOUGHT JUST DIDN'T LAST AND YOU DIDN'T HAVE MONEY TO GET MORE.: NEVER TRUE

## 2021-09-10 ASSESSMENT — ENCOUNTER SYMPTOMS
SORE THROAT: 0
ABDOMINAL PAIN: 0
SINUS PRESSURE: 0
CONSTIPATION: 0
EYE PAIN: 0
SHORTNESS OF BREATH: 0
BACK PAIN: 0
DIARRHEA: 0
COUGH: 0

## 2021-09-10 ASSESSMENT — PATIENT HEALTH QUESTIONNAIRE - PHQ9
1. LITTLE INTEREST OR PLEASURE IN DOING THINGS: 0
2. FEELING DOWN, DEPRESSED OR HOPELESS: 0
SUM OF ALL RESPONSES TO PHQ QUESTIONS 1-9: 0
SUM OF ALL RESPONSES TO PHQ QUESTIONS 1-9: 0
SUM OF ALL RESPONSES TO PHQ9 QUESTIONS 1 & 2: 0
SUM OF ALL RESPONSES TO PHQ QUESTIONS 1-9: 0

## 2021-09-10 ASSESSMENT — SOCIAL DETERMINANTS OF HEALTH (SDOH): HOW HARD IS IT FOR YOU TO PAY FOR THE VERY BASICS LIKE FOOD, HOUSING, MEDICAL CARE, AND HEATING?: NOT HARD AT ALL

## 2021-09-10 NOTE — PROGRESS NOTES
Josh Sahni  : 1965    Chief Complaint:     Chief Complaint   Patient presents with    Annual Exam     go over blood work and wants hormone levels checked       HPI  Toya Cabrera 49 y.o. presents for   Chief Complaint   Patient presents with    Annual Exam     go over blood work and wants hormone levels checked     Patient presents for annual physical.  She states she has been doing well overall. However she cannot tolerate the antiestrogen medication for her breast CA. She is considering getting a second opinion. They are considering injections instead of the tamoxifen or Arimidex. She also has been fatigued lately. She does wish for some blood work. She did have surgery in  her sugar was slightly high as well as her white count. She does wish for flu shot today. All questions were answered to patients satisfaction.     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 Sky Clarke, COLON, DIAGNOSTIC  16    egd    MASTECTOMY Right     TUNNELED VENOUS CATHETER PLACEMENT      VASCULAR SURGERY  2011    vericose veins       Social History     Socioeconomic History    Marital status:      Spouse name: None    Number of children: None    Years of education: None    Highest education level: None   Occupational History    None   Tobacco Use    Smoking status: Former Smoker     Quit date: 12/10/2002     Years since quittin.7    Smokeless tobacco: Never Used    Tobacco comment: social   Substance and Sexual Activity    Alcohol use: Yes     Comment: social    Drug use: No    Sexual activity: Yes   Other Topics Concern    None   Social History Narrative    None     Social Determinants of Health     Financial Resource Strain: Low Risk     Difficulty of Paying Living Expenses: Not hard at all   Food Insecurity: No Food Insecurity    Worried About Running Out of Food in the Last Year: Never true    Melonie of Food in the Last Year: Never true   Transportation Needs:     Lack of Transportation (Medical):  Lack of Transportation (Non-Medical):    Physical Activity:     Days of Exercise per Week:     Minutes of Exercise per Session:    Stress:     Feeling of Stress :    Social Connections:     Frequency of Communication with Friends and Family:     Frequency of Social Gatherings with Friends and Family:     Attends Latter day Services:     Active Member of Clubs or Organizations:     Attends Club or Organization Meetings:     Marital Status:    Intimate Partner Violence:     Fear of Current or Ex-Partner:     Emotionally Abused:     Physically Abused:     Sexually Abused:        Family History   Problem Relation Age of Onset    High Blood Pressure Mother     Heart Disease Mother         stent in 45s    Cancer Father         prostate    Substance Abuse Father         alcoholic    Cancer Sister 43        kidney    Cancer Sister         uterine precancer cells          Current Outpatient Medications   Medication Sig Dispense Refill    vitamin D (CHOLECALCIFEROL) 1000 UNIT TABS tablet Take 1,000 Units by mouth daily      Probiotic Product (SOLUBLE FIBER/PROBIOTICS PO) Take by mouth      MULTIPLE VITAMIN PO Take 1 tablet by mouth Last dose 5/13/16      cetirizine (ZYRTEC) 10 MG tablet Take 10 mg by mouth daily.         tamoxifen (NOLVADEX) 10 MG tablet Take 10 mg by mouth 2 times daily (Patient not taking: Reported on 9/10/2021)      BIOTIN PO Take by mouth (Patient not taking: Reported on 9/10/2021)      vitamin C (ASCORBIC ACID) 500 MG tablet Take 500 mg by mouth daily (Patient not taking: Reported on 9/10/2021)      vitamin E 400 UNIT capsule Take 400 Units by mouth daily (Patient not taking: Reported on 9/10/2021)      famotidine (PEPCID) 10 MG tablet Take 10 mg by mouth 2 times daily (Patient not taking: Reported on 9/10/2021)      CALCIUM-MAGNESIUM-ZINC PO Take by mouth (Patient not taking: Reported on 9/10/2021)      Omega-3 Fatty Acids (FISH OIL) 1000 MG CAPS Take 1,000 mg by mouth daily (Patient not taking: Reported on 9/10/2021)      NONFORMULARY CBD Oil (Patient not taking: Reported on 9/10/2021)      b complex vitamins capsule Take 1 capsule by mouth daily (Patient not taking: Reported on 9/10/2021)       Current Facility-Administered Medications   Medication Dose Route Frequency Provider Last Rate Last Admin    triamcinolone acetonide (KENALOG-40) injection 40 mg  40 mg Intra-articular Once Devon Quijano MD           Allergies   Allergen Reactions    Bactrim [Sulfamethoxazole-Trimethoprim] Hives and Itching    Pcn [Penicillins] Nausea And Vomiting       Health Maintenance Due   Topic Date Due    HIV screen  Never done    DTaP/Tdap/Td vaccine (1 - Tdap) Never done    Shingles Vaccine (1 of 2) Never done    Colon Cancer Screen FIT/FOBT  09/14/2019    Diabetes screen  02/10/2020    Cervical cancer screen  05/05/2020           REVIEW OF SYSTEMS  Review of Systems   Constitutional: Positive for fatigue. Negative for fever. HENT: Negative for ear pain, sinus pressure, sneezing and sore throat. Eyes: Negative for pain. Respiratory: Negative for cough and shortness of breath. Cardiovascular: Negative for chest pain and leg swelling. Gastrointestinal: Negative for abdominal pain, constipation and diarrhea. Genitourinary: Negative for dysuria and urgency. Musculoskeletal: Negative for back pain and myalgias. Skin: Negative for rash. Allergic/Immunologic: Negative for food allergies.    Neurological: Negative for light-headedness and headaches. Hematological: Does not bruise/bleed easily. Psychiatric/Behavioral: Negative for behavioral problems and sleep disturbance. PHYSICAL EXAM  /72 (Site: Left Upper Arm, Position: Sitting, Cuff Size: Medium Adult)   Pulse 80   Temp 97.3 °F (36.3 °C) (Temporal)   Resp 18   Ht 5' 8\" (1.727 m)   Wt 210 lb (95.3 kg)   LMP 09/09/2016   SpO2 98%   BMI 31.93 kg/m²   Physical Exam  Vitals and nursing note reviewed. Constitutional:       Appearance: She is well-developed. HENT:      Head: Normocephalic and atraumatic. Right Ear: External ear normal.      Left Ear: External ear normal.      Nose: Nose normal.   Eyes:      Conjunctiva/sclera: Conjunctivae normal.   Neck:      Thyroid: No thyromegaly. Cardiovascular:      Rate and Rhythm: Normal rate and regular rhythm. Heart sounds: Normal heart sounds. No murmur heard. Pulmonary:      Effort: Pulmonary effort is normal.      Breath sounds: Normal breath sounds. No wheezing. Abdominal:      Palpations: Abdomen is soft. Tenderness: There is no abdominal tenderness. Musculoskeletal:         General: No tenderness. Normal range of motion. Cervical back: Normal range of motion and neck supple. Lymphadenopathy:      Cervical: No cervical adenopathy. Skin:     General: Skin is warm and dry. Findings: No rash. Neurological:      Mental Status: She is alert and oriented to person, place, and time. Deep Tendon Reflexes: Reflexes are normal and symmetric. Psychiatric:         Behavior: Behavior normal.              Laboratory:   All laboratory and radiology results have been personally reviewed by myself    Lab Results   Component Value Date     06/25/2021    K 4.8 06/25/2021     06/25/2021    CO2 25 06/25/2021    BUN 13 06/25/2021    CREATININE 0.9 06/25/2021    PROT 7.4 10/06/2020    LABALBU 4.3 10/06/2020    CALCIUM 9.7 06/25/2021    GFRAA >60 06/25/2021    LABGLOM >60 06/25/2021    GLUCOSE 130 06/25/2021    GLUCOSE 104 02/16/2012    AST 16 10/06/2020    ALT 11 10/06/2020    ALKPHOS 113 10/06/2020    BILITOT 0.4 10/06/2020    TSH 2.680 10/06/2020    VITD25 44 10/06/2020    CHOL 208 10/06/2020    TRIG 75 10/06/2020    HDL 68 10/06/2020    LDLCALC 125 10/06/2020    LABA1C 4.9 02/10/2017        Lab Results   Component Value Date    CHOL 208 (H) 10/06/2020    CHOL 185 02/10/2017    CHOL 207 (H) 04/21/2016     Lab Results   Component Value Date    TRIG 75 10/06/2020    TRIG 55 02/10/2017    TRIG 76 04/21/2016     Lab Results   Component Value Date    HDL 68 10/06/2020    HDL 74 02/10/2017    HDL 93 04/21/2016     Lab Results   Component Value Date    LDLCALC 125 (H) 10/06/2020    LDLCALC 100 (H) 02/10/2017    LDLCALC 99 04/21/2016       Lab Results   Component Value Date    LABA1C 4.9 02/10/2017    LABA1C 5.2 04/21/2016    LABA1C 4.4 (L) 11/13/2013     Lab Results   Component Value Date    LDLCALC 125 (H) 10/06/2020    CREATININE 0.9 06/25/2021       ASSESSMENT/PLAN:     Diagnosis Orders   1. Annual physical exam  Lipid Panel    COMPREHENSIVE METABOLIC PANEL   2. Impaired fasting glucose  HEMOGLOBIN A1C    COMPREHENSIVE METABOLIC PANEL   3. Malignant neoplasm of female breast, unspecified estrogen receptor status, unspecified laterality, unspecified site of breast ([facility] [address]     4. Fatigue, unspecified type  TSH without Reflex    CBC Auto Differential   5. Vitamin D deficiency  Vitamin D 25 Hydroxy   6. Colon cancer screening  Cologuard (For External Results Only)   7. Need for influenza vaccination  INFLUENZA, MDCK QUADV, 2 YRS AND OLDER, IM, PF, PREFILL SYR OR SDV, 0.5ML (FLUCELVAX QUADV, PF)     Annual physical completed today. Labs to be completed as above. I did recommend either getting a second opinion or trying the injections. Patient is unsure what she should do. She will call if she does wish for a new referral for a second opinion. Otherwise will obtain labs as above.   Cologuard ordered and flu shot updated today    Problem list reviewed andsimplified/updated  HM reviewed today and counseled as appropriate    Call or go to ED immediately if symptoms worsen or persist.  No future appointments. Or sooner if necessary. Educational materials and/or homeexercises printed for patient's review and were included in patient instructions on his/her After Visit Summary and given to patient at the end of visit.       Counseled regarding above diagnosis, including possible risks and complications,  especially if left uncontrolled.     Counseled regarding the possible side effects, risks, benefits and alternatives to treatment; patient and/or guardian verbalizes understanding, agrees,feels comfortable with and wishes to proceed with above treatment plan.     Advised patient to call Carla Dianep new medication issues, and read all Rx info from pharmacy to assure aware of all possible risks and side effects of medication before taking.     Reviewed age and gender appropriate health screening exams and vaccinations. Advised patient regarding importance of keeping up with recommended health maintenance and toschedule as soon as possible if overdue, as this is important in assessing for undiagnosed pathology, especially cancer, as well as protecting against potentially harmful/life threatening disease.          Patient and/or guardian verbalizes understanding and agrees with above counseling, assessment and plan.     All questions answered. Cecy Engel DO  9/10/21    NOTE: This report was transcribed using voice recognition software.  Every effort was made to ensure accuracy; however, inadvertent computerized transcription errors may be present

## 2021-09-13 ENCOUNTER — HOSPITAL ENCOUNTER (OUTPATIENT)
Age: 56
Discharge: HOME | End: 2021-09-13
Payer: COMMERCIAL

## 2021-09-13 LAB
BASOPHILS ABSOLUTE: 0.03 E9/L (ref 0–0.2)
BASOPHILS RELATIVE PERCENT: 0.3 % (ref 0–2)
EOSINOPHILS ABSOLUTE: 0.27 E9/L (ref 0.05–0.5)
EOSINOPHILS RELATIVE PERCENT: 3.1 % (ref 0–6)
HCT VFR BLD CALC: 39.6 % (ref 34–48)
HEMOGLOBIN: 12.9 G/DL (ref 11.5–15.5)
IMMATURE GRANULOCYTES #: 0.03 E9/L
IMMATURE GRANULOCYTES %: 0.3 % (ref 0–5)
LYMPHOCYTES ABSOLUTE: 1.77 E9/L (ref 1.5–4)
LYMPHOCYTES RELATIVE PERCENT: 20.4 % (ref 20–42)
MCH RBC QN AUTO: 26.8 PG (ref 26–35)
MCHC RBC AUTO-ENTMCNC: 32.6 % (ref 32–34.5)
MCV RBC AUTO: 82.3 FL (ref 80–99.9)
MONOCYTES ABSOLUTE: 0.63 E9/L (ref 0.1–0.95)
MONOCYTES RELATIVE PERCENT: 7.3 % (ref 2–12)
NEUTROPHILS ABSOLUTE: 5.93 E9/L (ref 1.8–7.3)
NEUTROPHILS RELATIVE PERCENT: 68.6 % (ref 43–80)
PDW BLD-RTO: 14.2 FL (ref 11.5–15)
PLATELET # BLD: 335 E9/L (ref 130–450)
PMV BLD AUTO: 9 FL (ref 7–12)
RBC # BLD: 4.81 E12/L (ref 3.5–5.5)
WBC # BLD: 8.7 E9/L (ref 4.5–11.5)

## 2021-09-13 PROCEDURE — 36415 COLL VENOUS BLD VENIPUNCTURE: CPT

## 2021-09-13 PROCEDURE — 85025 COMPLETE CBC W/AUTO DIFF WBC: CPT

## 2022-03-04 ENCOUNTER — TELEPHONE (OUTPATIENT)
Dept: FAMILY MEDICINE CLINIC | Age: 57
End: 2022-03-04

## 2022-03-04 NOTE — TELEPHONE ENCOUNTER
Called pt regarding MRI results.  Did patient have ultrasound biopsy as recommended by radiologist?       Lm for pt to r/c

## 2022-08-26 ENCOUNTER — OFFICE VISIT (OUTPATIENT)
Dept: FAMILY MEDICINE CLINIC | Age: 57
End: 2022-08-26
Payer: COMMERCIAL

## 2022-08-26 VITALS
SYSTOLIC BLOOD PRESSURE: 132 MMHG | TEMPERATURE: 97.4 F | HEIGHT: 68 IN | OXYGEN SATURATION: 97 % | BODY MASS INDEX: 31.52 KG/M2 | HEART RATE: 96 BPM | DIASTOLIC BLOOD PRESSURE: 89 MMHG | WEIGHT: 208 LBS | RESPIRATION RATE: 20 BRPM

## 2022-08-26 DIAGNOSIS — Z13.220 SCREENING FOR CHOLESTEROL LEVEL: ICD-10-CM

## 2022-08-26 DIAGNOSIS — Z78.0 POST-MENOPAUSAL: ICD-10-CM

## 2022-08-26 DIAGNOSIS — Z76.89 ESTABLISHING CARE WITH NEW DOCTOR, ENCOUNTER FOR: Primary | ICD-10-CM

## 2022-08-26 DIAGNOSIS — E55.9 VITAMIN D DEFICIENCY: ICD-10-CM

## 2022-08-26 DIAGNOSIS — Z11.4 SCREENING FOR HIV (HUMAN IMMUNODEFICIENCY VIRUS): ICD-10-CM

## 2022-08-26 DIAGNOSIS — Z23 NEED FOR DIPHTHERIA-TETANUS-PERTUSSIS (TDAP) VACCINE: ICD-10-CM

## 2022-08-26 DIAGNOSIS — R53.83 FATIGUE, UNSPECIFIED TYPE: ICD-10-CM

## 2022-08-26 DIAGNOSIS — Z85.3 HISTORY OF BREAST CANCER: ICD-10-CM

## 2022-08-26 DIAGNOSIS — Z00.00 ROUTINE ADULT HEALTH MAINTENANCE: ICD-10-CM

## 2022-08-26 DIAGNOSIS — R73.01 IMPAIRED FASTING GLUCOSE: ICD-10-CM

## 2022-08-26 DIAGNOSIS — Z11.59 ENCOUNTER FOR HEPATITIS C SCREENING TEST FOR LOW RISK PATIENT: ICD-10-CM

## 2022-08-26 PROCEDURE — 90715 TDAP VACCINE 7 YRS/> IM: CPT | Performed by: STUDENT IN AN ORGANIZED HEALTH CARE EDUCATION/TRAINING PROGRAM

## 2022-08-26 PROCEDURE — 99213 OFFICE O/P EST LOW 20 MIN: CPT | Performed by: STUDENT IN AN ORGANIZED HEALTH CARE EDUCATION/TRAINING PROGRAM

## 2022-08-26 PROCEDURE — 90471 IMMUNIZATION ADMIN: CPT | Performed by: STUDENT IN AN ORGANIZED HEALTH CARE EDUCATION/TRAINING PROGRAM

## 2022-08-26 RX ORDER — CYCLOBENZAPRINE HCL 5 MG
TABLET ORAL
COMMUNITY
Start: 2022-06-29

## 2022-08-26 ASSESSMENT — ENCOUNTER SYMPTOMS
BLOOD IN STOOL: 0
CONSTIPATION: 0
COUGH: 0
SHORTNESS OF BREATH: 0
WHEEZING: 0
DIARRHEA: 0
ABDOMINAL PAIN: 0
NAUSEA: 0

## 2022-08-26 ASSESSMENT — PATIENT HEALTH QUESTIONNAIRE - PHQ9
2. FEELING DOWN, DEPRESSED OR HOPELESS: 0
SUM OF ALL RESPONSES TO PHQ QUESTIONS 1-9: 0
SUM OF ALL RESPONSES TO PHQ9 QUESTIONS 1 & 2: 0
1. LITTLE INTEREST OR PLEASURE IN DOING THINGS: 0
SUM OF ALL RESPONSES TO PHQ QUESTIONS 1-9: 0

## 2022-08-26 NOTE — PROGRESS NOTES
Romi Conway (:  1965) is a 56 y.o. female, New patient , established at the office, here for evaluation of the following:  Establish Care         ASSESSMENT/PLAN      1. Establishing care with new doctor, encounter for  Patient here to establish care, overall doing well, she is a breast cancer survivor, does [facility], we will try to get these, is unable to take any of the medications for the ER/MT positivity due to side effects, encouraged healthy lifestyle, also has MTHFR mutation, takes methylated folate, did have a clot with one of her sons, no current problems he is due for labs in September, will order these now, also will get updated on vaccines  2. Impaired fasting glucose  -     Hemoglobin A1C; Future  3. Post-menopausal  -     Vitamin D 25 Hydroxy; Future  4. Vitamin D deficiency  5. Malignant neoplasm of female breast, unspecified estrogen receptor status, unspecified laterality, unspecified site of breast ([facility] [address]  6. Need for diphtheria-tetanus-pertussis (Tdap) vaccine  -     Tdap, BOOSTRIX, (age 8 yrs+), IM  7. Fatigue, unspecified type  -     CBC with Auto Differential; Future  -     Comprehensive Metabolic Panel; Future  -     TSH; Future  8. Routine adult health maintenance  -     TSH; Future  9. Screening for HIV (human immunodeficiency virus)  -     HIV Screen; Future  10. Encounter for hepatitis C screening test for low risk patient  -     Hepatitis C Antibody; Future  11. Screening for cholesterol level  -     Lipid Panel; Future  Return in about 1 year (around 2023) for Wellness Visit. Subjective   SUBJECTIVE/OBJECTIVE:  HPI    She is here to establish care. She is a breast cancer survivor. Has been 5 years. Goes [facility]. He had cat scan recently. She did have a lymph node that was recently checked and was ok. She does not want to take any of the aromatase inhibitor. She went through [facility].        Declined preventative screening identified as care gaps unless ordered through this visit    PHQ2/PHQ9  PHQ-2 Score: 0  PHQ-2 Over the past 2 weeks, how often have you been bothered by any of the following problems? Little interest or pleasure in doing things: Not at all  Feeling down, depressed, or hopeless: Not at all  PHQ-2 Score: 0   PHQ-9 Total Score: 0 (8/26/2022 11:02 AM)       Past Medical History:  has a past medical history of Breast cancer ([facility] [address] Breast disorder, Cancer ([facility] [address] GERD (gastroesophageal reflux disease), IBS (irritable bowel syndrome), MTHFR (methylene THF reductase) deficiency and homocystinuria ([facility] [address] and Pulmonary embolism ([facility] [address] Past Surgical History:  has a past surgical history that includes vascular surgery (2011); Colonoscopy (1/9/13); Endoscopy, colon, diagnostic (5/19/16); Tunneled venous catheter placement; Mastectomy (Right); and US BREAST BIOPSY W LOC DEVICE 1ST LESION LEFT (Left, 3/16/2022). Social History:  reports that she quit smoking about 19 years ago. Her smoking use included cigarettes. She has never used smokeless tobacco. She reports current alcohol use. She reports that she does not use drugs. Family History: family history includes Cancer in her father and sister; Cancer (age of onset: 43) in her sister; Heart Disease in her mother; High Blood Pressure in her mother; Substance Abuse in her father. Allergies: Bactrim [sulfamethoxazole-trimethoprim] and Pcn [penicillins]  Medications:   Current Outpatient Medications   Medication Sig Dispense Refill    cyclobenzaprine (FLEXERIL) 5 MG tablet TAKE 1 TABLET BY MOUTH EVERY 8 HOURS AS NEEDED      vitamin D (CHOLECALCIFEROL) 1000 UNIT TABS tablet Take 1,000 Units by mouth daily      Probiotic Product (SOLUBLE FIBER/PROBIOTICS PO) Take by mouth      MULTIPLE VITAMIN PO Take 1 tablet by mouth Last dose 5/13/16      cetirizine (ZYRTEC) 10 MG tablet Take 10 mg by mouth daily. No current facility-administered medications for this visit.       Allergies: Bactrim The 10-year ASCVD risk score (Ashtyn Rosales, et al., 2013) is: 1.7%    Values used to calculate the score:      Age: 56 years      Sex: Female      Is Non- : No      Diabetic: No      Tobacco smoker: No      Systolic Blood Pressure: 885 mmHg      Is BP treated: No      HDL Cholesterol: 59 mg/dL      Total Cholesterol: 169 mg/dL     Physical Exam  Constitutional:       General: She is not in acute distress. Appearance: Normal appearance. HENT:      Head: Normocephalic and atraumatic. Right Ear: External ear normal.      Nose: Nose normal.      Mouth/Throat:      Mouth: Mucous membranes are moist.   Eyes:      Extraocular Movements: Extraocular movements intact. Conjunctiva/sclera: Conjunctivae normal.   Cardiovascular:      Rate and Rhythm: Normal rate and regular rhythm. Heart sounds: No murmur heard. Pulmonary:      Effort: Pulmonary effort is normal.      Breath sounds: Normal breath sounds. No wheezing. Musculoskeletal:         General: Normal range of motion. Cervical back: Normal range of motion and neck supple. Lymphadenopathy:      Cervical: No cervical adenopathy. Neurological:      General: No focal deficit present. Mental Status: She is alert. Psychiatric:         Mood and Affect: Mood normal.         Behavior: Behavior normal.           An electronic signature was used to authenticate this note. --Jojo Khan MD       *NOTE: This report was transcribed using voice recognition software. Every effort was made to ensure accuracy; however, inadvertent computerized transcription errors may be present.

## 2022-09-21 ENCOUNTER — HOSPITAL ENCOUNTER (OUTPATIENT)
Age: 57
Discharge: HOME | End: 2022-09-21
Payer: COMMERCIAL

## 2022-09-21 DIAGNOSIS — Z11.59 ENCOUNTER FOR HEPATITIS C SCREENING TEST FOR LOW RISK PATIENT: ICD-10-CM

## 2022-09-21 DIAGNOSIS — Z00.00 ROUTINE ADULT HEALTH MAINTENANCE: ICD-10-CM

## 2022-09-21 DIAGNOSIS — Z11.4 SCREENING FOR HIV (HUMAN IMMUNODEFICIENCY VIRUS): ICD-10-CM

## 2022-09-21 DIAGNOSIS — R73.01 IMPAIRED FASTING GLUCOSE: ICD-10-CM

## 2022-09-21 DIAGNOSIS — Z13.220 SCREENING FOR CHOLESTEROL LEVEL: ICD-10-CM

## 2022-09-21 DIAGNOSIS — Z78.0 POST-MENOPAUSAL: ICD-10-CM

## 2022-09-21 DIAGNOSIS — R53.83 FATIGUE, UNSPECIFIED TYPE: ICD-10-CM

## 2022-09-21 LAB
ALBUMIN SERPL-MCNC: 4.5 G/DL (ref 3.5–5.2)
ALP BLD-CCNC: 109 U/L (ref 35–104)
ALT SERPL-CCNC: 9 U/L (ref 0–32)
ANION GAP SERPL CALCULATED.3IONS-SCNC: 9 MMOL/L (ref 7–16)
AST SERPL-CCNC: 18 U/L (ref 0–31)
BASOPHILS ABSOLUTE: 0.05 E9/L (ref 0–0.2)
BASOPHILS RELATIVE PERCENT: 0.6 % (ref 0–2)
BILIRUB SERPL-MCNC: 0.3 MG/DL (ref 0–1.2)
BUN BLDV-MCNC: 18 MG/DL (ref 6–20)
CALCIUM SERPL-MCNC: 10 MG/DL (ref 8.6–10.2)
CHLORIDE BLD-SCNC: 103 MMOL/L (ref 98–107)
CHOLESTEROL, TOTAL: 206 MG/DL (ref 0–199)
CO2: 27 MMOL/L (ref 22–29)
CREAT SERPL-MCNC: 1.1 MG/DL (ref 0.5–1)
EOSINOPHILS ABSOLUTE: 0.29 E9/L (ref 0.05–0.5)
EOSINOPHILS RELATIVE PERCENT: 3.5 % (ref 0–6)
GFR AFRICAN AMERICAN: >60
GFR NON-AFRICAN AMERICAN: 52 ML/MIN/1.73
GLUCOSE BLD-MCNC: 88 MG/DL (ref 74–99)
HBA1C MFR BLD: 5.6 % (ref 4–5.6)
HCT VFR BLD CALC: 40 % (ref 34–48)
HDLC SERPL-MCNC: 73 MG/DL
HEMOGLOBIN: 12.8 G/DL (ref 11.5–15.5)
IMMATURE GRANULOCYTES #: 0.02 E9/L
IMMATURE GRANULOCYTES %: 0.2 % (ref 0–5)
LDL CHOLESTEROL CALCULATED: 115 MG/DL (ref 0–99)
LYMPHOCYTES ABSOLUTE: 1.5 E9/L (ref 1.5–4)
LYMPHOCYTES RELATIVE PERCENT: 18.3 % (ref 20–42)
MCH RBC QN AUTO: 26.5 PG (ref 26–35)
MCHC RBC AUTO-ENTMCNC: 32 % (ref 32–34.5)
MCV RBC AUTO: 82.8 FL (ref 80–99.9)
MONOCYTES ABSOLUTE: 0.5 E9/L (ref 0.1–0.95)
MONOCYTES RELATIVE PERCENT: 6.1 % (ref 2–12)
NEUTROPHILS ABSOLUTE: 5.83 E9/L (ref 1.8–7.3)
NEUTROPHILS RELATIVE PERCENT: 71.3 % (ref 43–80)
PDW BLD-RTO: 14.4 FL (ref 11.5–15)
PLATELET # BLD: 353 E9/L (ref 130–450)
PMV BLD AUTO: 9.2 FL (ref 7–12)
POTASSIUM SERPL-SCNC: 4.8 MMOL/L (ref 3.5–5)
RBC # BLD: 4.83 E12/L (ref 3.5–5.5)
SODIUM BLD-SCNC: 139 MMOL/L (ref 132–146)
TOTAL PROTEIN: 7.2 G/DL (ref 6.4–8.3)
TRIGL SERPL-MCNC: 91 MG/DL (ref 0–149)
TSH SERPL DL<=0.05 MIU/L-ACNC: 2.43 UIU/ML (ref 0.27–4.2)
VITAMIN D 25-HYDROXY: 55 NG/ML (ref 30–100)
VLDLC SERPL CALC-MCNC: 18 MG/DL
WBC # BLD: 8.2 E9/L (ref 4.5–11.5)

## 2022-09-21 PROCEDURE — 36415 COLL VENOUS BLD VENIPUNCTURE: CPT

## 2022-09-21 PROCEDURE — 82306 VITAMIN D 25 HYDROXY: CPT

## 2022-09-21 PROCEDURE — 86803 HEPATITIS C AB TEST: CPT

## 2022-09-21 PROCEDURE — 84443 ASSAY THYROID STIM HORMONE: CPT

## 2022-09-21 PROCEDURE — 80061 LIPID PANEL: CPT

## 2022-09-21 PROCEDURE — 86703 HIV-1/HIV-2 1 RESULT ANTBDY: CPT

## 2022-09-21 PROCEDURE — 83036 HEMOGLOBIN GLYCOSYLATED A1C: CPT

## 2022-09-21 PROCEDURE — 80053 COMPREHEN METABOLIC PANEL: CPT

## 2022-09-21 PROCEDURE — 85025 COMPLETE CBC W/AUTO DIFF WBC: CPT

## 2022-09-23 LAB
HEPATITIS C ANTIBODY INTERPRETATION: NORMAL
HIV-1 AND HIV-2 ANTIBODIES: NORMAL

## 2022-10-06 ENCOUNTER — TELEPHONE (OUTPATIENT)
Dept: FAMILY MEDICINE CLINIC | Age: 57
End: 2022-10-06

## 2022-10-06 NOTE — TELEPHONE ENCOUNTER
Pt would like doctor to go over the results of the ultrasound carotid. The eye doctor has not gotten back with the results.     She would like to know what is going on with the results

## 2022-11-09 ENCOUNTER — PATIENT MESSAGE (OUTPATIENT)
Dept: FAMILY MEDICINE CLINIC | Age: 57
End: 2022-11-09

## 2022-11-09 DIAGNOSIS — N18.31 STAGE 3A CHRONIC KIDNEY DISEASE (HCC): Primary | ICD-10-CM

## 2022-11-10 NOTE — TELEPHONE ENCOUNTER
From: Talia Mcmullen  To: Dr. Nery Estrella  Sent: 11/9/2022 6:40 PM EST  Subject: CKD? Hello doctor, I saw the message you left on October 5 after my blood test results. I'm confused to learn that I must have inadvertently signed up for bad health news to be received in messages from you as my new PCP. In the future, I hope that at least your nurse can call with any discerning news. Your message regarding CKD 3a is alarming, since I have never been informed of this diagnosis before by any of my PCP's or Oncologists until now. I opened up the past results for GFR that were Never FLAGGED L (low) for several years back. I figured the results may have varied during my cancer treatments, but I see inconsistencies the last 5 years that were never mentioned to me. I believe at this time I need to consult a Kidney doctor for more information that possibly led me to this diagnosis. My [facility] plan states that Maria Luz Alfred MD at [facility] is in my Network which I would like a referral for please.

## 2022-11-21 ENCOUNTER — TELEPHONE (OUTPATIENT)
Dept: FAMILY MEDICINE CLINIC | Age: 57
End: 2022-11-21

## 2022-11-21 DIAGNOSIS — N18.31 STAGE 3A CHRONIC KIDNEY DISEASE (HCC): Primary | ICD-10-CM

## 2022-11-21 NOTE — TELEPHONE ENCOUNTER
Needs a new referral Nephrologist.  Kassy Camarillo  [address]  [address] [address] [address]  Fax [phone]

## 2023-01-18 ENCOUNTER — COMMUNITY OUTREACH (OUTPATIENT)
Dept: FAMILY MEDICINE CLINIC | Age: 58
End: 2023-01-18

## 2023-01-18 NOTE — PROGRESS NOTES
Patient's HM shows they are overdue for Mammogram and Colorectal Screening. Care Everywhere and  files searched. 2013 colon results to attach to order and HM updated.

## 2023-08-24 LAB — MAMMOGRAPHY, EXTERNAL: NORMAL

## 2023-08-29 ENCOUNTER — TELEPHONE (OUTPATIENT)
Dept: FAMILY MEDICINE CLINIC | Age: 58
End: 2023-08-29

## 2023-08-29 DIAGNOSIS — N18.31 STAGE 3A CHRONIC KIDNEY DISEASE (HCC): Primary | ICD-10-CM

## 2023-08-29 NOTE — TELEPHONE ENCOUNTER
----- Message from Antonio Mendoza sent at 8/24/2023  3:24 PM EDT -----  Subject: Message to Provider    QUESTIONS  Information for Provider? patient has her annual wellness visit scheduled   for 10/16 with Dr. Tiffanie Knight. Patient was given a referral to Nephrology Dr. Donna Lundy but changed insurance so she would like to have this referral   faxed to Dr. Jordy Beck at [phone]  ---------------------------------------------------------------------------  --------------  [address]  [phone]; OK to leave message on voicemail  ---------------------------------------------------------------------------  --------------  SCRIPT ANSWERS  Relationship to Patient?  Self

## 2023-08-31 NOTE — TELEPHONE ENCOUNTER
1. Stage 3a chronic kidney disease [facility])  -     External Referral To Nephrology

## 2023-09-19 LAB
BASOPHILS ABSOLUTE: NORMAL
BASOPHILS RELATIVE PERCENT: NORMAL
EOSINOPHILS ABSOLUTE: NORMAL
EOSINOPHILS RELATIVE PERCENT: NORMAL
GFR, ESTIMATED: NORMAL
HCT VFR BLD CALC: NORMAL %
HEMOGLOBIN: NORMAL
LYMPHOCYTES ABSOLUTE: NORMAL
LYMPHOCYTES RELATIVE PERCENT: NORMAL
MCH RBC QN AUTO: NORMAL PG
MCHC RBC AUTO-ENTMCNC: NORMAL G/DL
MCV RBC AUTO: NORMAL FL
MONOCYTES ABSOLUTE: NORMAL
MONOCYTES RELATIVE PERCENT: NORMAL
NEUTROPHILS ABSOLUTE: NORMAL
NEUTROPHILS RELATIVE PERCENT: NORMAL
PLATELET # BLD: NORMAL 10*3/UL
PMV BLD AUTO: NORMAL FL
RBC # BLD: NORMAL 10*6/UL
WBC # BLD: NORMAL 10*3/UL

## 2023-10-10 ENCOUNTER — HOSPITAL ENCOUNTER (OUTPATIENT)
Dept: ULTRASOUND IMAGING | Age: 58
Discharge: HOME | End: 2023-10-12
Attending: INTERNAL MEDICINE
Payer: COMMERCIAL

## 2023-10-10 DIAGNOSIS — N18.30 STAGE 3 CHRONIC KIDNEY DISEASE, UNSPECIFIED WHETHER STAGE 3A OR 3B CKD (HCC): ICD-10-CM

## 2023-10-10 PROCEDURE — 76770 US EXAM ABDO BACK WALL COMP: CPT

## 2023-10-12 ENCOUNTER — HOSPITAL ENCOUNTER (OUTPATIENT)
Age: 58
Setting detail: SPECIMEN
Discharge: HOME | End: 2023-10-12
Payer: COMMERCIAL

## 2023-10-12 LAB
COLLECT DURATION TIME UR: 24 H
CREAT SERPL-MCNC: 1 MG/DL (ref 0.5–1)
CREAT UR-MCNC: 62.6 MG/DL (ref 720–1510)
CREAT UR-MCNC: 87.4 MG/DL (ref 29–226)
CREATINE 24H UR-MRATE: 1065 MG/24 H (ref 720–1510)
CREATININE CLEARANCE: 61.6 ML/MIN/BSA (ref 67–121)
HOURS COLLECTED: 24 H
MICROALBUMIN UR-MCNC: <12 MG/L (ref 0–19)
PATIENT HEIGHT: 68 IN
PROTEIN 24 HOUR URINE: 65 MG/24 H (ref 0–150)
RBC #/AREA URNS HPF: NORMAL /HPF
SPECIMEN VOL 24H UR: 1700 ML
TOTAL PROTEIN, URINE: 6 MG/DL (ref 0–12)
URINE TOTAL PROTEIN CREATININE RATIO: 0.07 (ref 0–0.2)
VOLUME: 1700 ML
WBC #/AREA URNS HPF: NORMAL /HPF

## 2023-10-12 PROCEDURE — 82043 UR ALBUMIN QUANTITATIVE: CPT

## 2023-10-12 PROCEDURE — 84156 ASSAY OF PROTEIN URINE: CPT

## 2023-10-12 PROCEDURE — 36415 COLL VENOUS BLD VENIPUNCTURE: CPT

## 2023-10-12 PROCEDURE — 82575 CREATININE CLEARANCE TEST: CPT

## 2023-10-12 PROCEDURE — 81015 MICROSCOPIC EXAM OF URINE: CPT

## 2023-10-12 PROCEDURE — 82570 ASSAY OF URINE CREATININE: CPT

## 2023-10-12 ASSESSMENT — PATIENT HEALTH QUESTIONNAIRE - PHQ9
SUM OF ALL RESPONSES TO PHQ9 QUESTIONS 1 & 2: 0
1. LITTLE INTEREST OR PLEASURE IN DOING THINGS: 0
1. LITTLE INTEREST OR PLEASURE IN DOING THINGS: NOT AT ALL
SUM OF ALL RESPONSES TO PHQ QUESTIONS 1-9: 0
SUM OF ALL RESPONSES TO PHQ QUESTIONS 1-9: 0
SUM OF ALL RESPONSES TO PHQ9 QUESTIONS 1 & 2: 0
SUM OF ALL RESPONSES TO PHQ QUESTIONS 1-9: 0
2. FEELING DOWN, DEPRESSED OR HOPELESS: 0
2. FEELING DOWN, DEPRESSED OR HOPELESS: NOT AT ALL
SUM OF ALL RESPONSES TO PHQ QUESTIONS 1-9: 0

## 2023-10-16 ENCOUNTER — OFFICE VISIT (OUTPATIENT)
Dept: FAMILY MEDICINE CLINIC | Age: 58
End: 2023-10-16
Payer: COMMERCIAL

## 2023-10-16 VITALS
HEART RATE: 98 BPM | RESPIRATION RATE: 20 BRPM | BODY MASS INDEX: 31.52 KG/M2 | WEIGHT: 208 LBS | HEIGHT: 68 IN | DIASTOLIC BLOOD PRESSURE: 76 MMHG | OXYGEN SATURATION: 99 % | TEMPERATURE: 96.9 F | SYSTOLIC BLOOD PRESSURE: 118 MMHG

## 2023-10-16 DIAGNOSIS — J84.10 LUNG FIBROSIS (HCC): ICD-10-CM

## 2023-10-16 DIAGNOSIS — E78.00 PURE HYPERCHOLESTEROLEMIA: ICD-10-CM

## 2023-10-16 DIAGNOSIS — R73.01 IMPAIRED FASTING GLUCOSE: ICD-10-CM

## 2023-10-16 DIAGNOSIS — Z86.711 HISTORY OF PULMONARY EMBOLISM: ICD-10-CM

## 2023-10-16 DIAGNOSIS — R91.1 LUNG NODULE: ICD-10-CM

## 2023-10-16 DIAGNOSIS — E72.12 MTHFR (METHYLENE THF REDUCTASE) DEFICIENCY AND HOMOCYSTINURIA (HCC): ICD-10-CM

## 2023-10-16 DIAGNOSIS — Z71.89 ACP (ADVANCE CARE PLANNING): ICD-10-CM

## 2023-10-16 DIAGNOSIS — Z00.00 ENCOUNTER FOR WELL ADULT EXAM WITHOUT ABNORMAL FINDINGS: Primary | ICD-10-CM

## 2023-10-16 DIAGNOSIS — Z90.11 HISTORY OF RIGHT MASTECTOMY: ICD-10-CM

## 2023-10-16 DIAGNOSIS — E55.9 VITAMIN D INSUFFICIENCY: ICD-10-CM

## 2023-10-16 DIAGNOSIS — J92.9 PLEURAL THICKENING: ICD-10-CM

## 2023-10-16 DIAGNOSIS — E72.11 MTHFR (METHYLENE THF REDUCTASE) DEFICIENCY AND HOMOCYSTINURIA (HCC): ICD-10-CM

## 2023-10-16 DIAGNOSIS — N18.31 STAGE 3A CHRONIC KIDNEY DISEASE (HCC): ICD-10-CM

## 2023-10-16 DIAGNOSIS — R53.83 FATIGUE, UNSPECIFIED TYPE: ICD-10-CM

## 2023-10-16 PROCEDURE — 99396 PREV VISIT EST AGE 40-64: CPT | Performed by: STUDENT IN AN ORGANIZED HEALTH CARE EDUCATION/TRAINING PROGRAM

## 2023-10-16 SDOH — ECONOMIC STABILITY: INCOME INSECURITY: HOW HARD IS IT FOR YOU TO PAY FOR THE VERY BASICS LIKE FOOD, HOUSING, MEDICAL CARE, AND HEATING?: NOT HARD AT ALL

## 2023-10-16 SDOH — ECONOMIC STABILITY: HOUSING INSECURITY
IN THE LAST 12 MONTHS, WAS THERE A TIME WHEN YOU DID NOT HAVE A STEADY PLACE TO SLEEP OR SLEPT IN A SHELTER (INCLUDING NOW)?: NO

## 2023-10-16 SDOH — ECONOMIC STABILITY: FOOD INSECURITY: WITHIN THE PAST 12 MONTHS, THE FOOD YOU BOUGHT JUST DIDN'T LAST AND YOU DIDN'T HAVE MONEY TO GET MORE.: NEVER TRUE

## 2023-10-16 SDOH — ECONOMIC STABILITY: FOOD INSECURITY: WITHIN THE PAST 12 MONTHS, YOU WORRIED THAT YOUR FOOD WOULD RUN OUT BEFORE YOU GOT MONEY TO BUY MORE.: NEVER TRUE

## 2023-10-16 ASSESSMENT — ENCOUNTER SYMPTOMS
SHORTNESS OF BREATH: 0
ABDOMINAL PAIN: 0
COUGH: 0
DIARRHEA: 0
WHEEZING: 0
BLOOD IN STOOL: 0
NAUSEA: 0
CONSTIPATION: 0

## 2023-10-16 NOTE — PROGRESS NOTES
10y+), IM, 0.5mL 08/26/2022        Health Maintenance   Topic Date Due    Hepatitis B vaccine (1 of 3 - 3-dose series) Never done    Cervical cancer screen  07/25/2020    COVID-19 Vaccine (6 - Moderna series) 03/01/2022    Colorectal Cancer Screen  01/19/2023    GFR test (Diabetes, CKD 3-4, OR last GFR 15-59)  09/21/2023    Shingles vaccine (2 of 2) 11/16/2023    Breast cancer screen  08/24/2024    Depression Screen  10/12/2024    Lipids  09/21/2027    DTaP/Tdap/Td vaccine (2 - Td or Tdap) 08/26/2032    Flu vaccine  Completed    Hepatitis C screen  Completed    HIV screen  Completed    Hepatitis A vaccine  Aged Out    Hib vaccine  Aged Out    Meningococcal (ACWY) vaccine  Aged Out    Pneumococcal 0-64 years Vaccine  Aged Out    Diabetes screen  Discontinued     Recommendations for [employer] Due: see orders and patient instructions/AVS.    Return in about 6 months (around 4/16/2024) for Follow up chronic disease. Obesity Counseling: Assessed behavioral health risks and factors affecting choice of behavior. Suggested weight control approaches, including dietary changes behavioral modification and follow up plan. Provided educational and support documentation.   Time spent (minutes): 3

## 2023-10-31 ENCOUNTER — OFFICE VISIT (OUTPATIENT)
Dept: PULMONOLOGY | Age: 58
End: 2023-10-31
Payer: COMMERCIAL

## 2023-10-31 ENCOUNTER — HOSPITAL ENCOUNTER (OUTPATIENT)
Age: 58
Discharge: HOME | End: 2023-10-31
Payer: COMMERCIAL

## 2023-10-31 VITALS
WEIGHT: 209.6 LBS | TEMPERATURE: 97.4 F | OXYGEN SATURATION: 98 % | RESPIRATION RATE: 16 BRPM | HEIGHT: 68 IN | BODY MASS INDEX: 31.77 KG/M2 | HEART RATE: 69 BPM

## 2023-10-31 DIAGNOSIS — E72.12 MTHFR (METHYLENE THF REDUCTASE) DEFICIENCY AND HOMOCYSTINURIA (HCC): ICD-10-CM

## 2023-10-31 DIAGNOSIS — R53.83 FATIGUE, UNSPECIFIED TYPE: ICD-10-CM

## 2023-10-31 DIAGNOSIS — R91.1 LUNG NODULE: Primary | ICD-10-CM

## 2023-10-31 DIAGNOSIS — R73.01 IMPAIRED FASTING GLUCOSE: ICD-10-CM

## 2023-10-31 DIAGNOSIS — E78.00 PURE HYPERCHOLESTEROLEMIA: ICD-10-CM

## 2023-10-31 DIAGNOSIS — E55.9 VITAMIN D INSUFFICIENCY: ICD-10-CM

## 2023-10-31 DIAGNOSIS — J92.9 PLEURAL THICKENING: ICD-10-CM

## 2023-10-31 DIAGNOSIS — E72.11 MTHFR (METHYLENE THF REDUCTASE) DEFICIENCY AND HOMOCYSTINURIA (HCC): ICD-10-CM

## 2023-10-31 LAB
25(OH)D3 SERPL-MCNC: 41.1 NG/ML (ref 30–100)
CHOLEST SERPL-MCNC: 216 MG/DL
EXPIRATORY TIME: 5.81 SEC
FEF 25-75% %PRED-PRE: 102 L/SEC
FEF 25-75% PRED: 5.49 L/SEC
FEF 25-75%-PRE: 5.64 L/SEC
FEV1 %PRED-PRE: 102 %
FEV1 PRED: 2.95 L
FEV1/FVC %PRED-PRE: 104 %
FEV1/FVC PRED: 79 %
FEV1/FVC: 82 %
FEV1: 3.03 L
FOLATE SERPL-MCNC: 6.4 NG/ML (ref 4.8–24.2)
FVC %PRED-PRE: 97 %
FVC PRED: 3.76 L
FVC: 3.67 L
HBA1C MFR BLD: 5.7 % (ref 4–5.6)
HDLC SERPL-MCNC: 68 MG/DL
LDLC SERPL CALC-MCNC: 128 MG/DL
PEF %PRED-PRE: 98 L/SEC
PEF PRED: 7.11 L/SEC
PEF-PRE: 7.03 L/SEC
TRIGL SERPL-MCNC: 100 MG/DL
TSH SERPL DL<=0.05 MIU/L-ACNC: 2.16 UIU/ML (ref 0.27–4.2)
VIT B12 SERPL-MCNC: 425 PG/ML (ref 211–946)
VLDLC SERPL CALC-MCNC: 20 MG/DL

## 2023-10-31 PROCEDURE — 80061 LIPID PANEL: CPT

## 2023-10-31 PROCEDURE — 83036 HEMOGLOBIN GLYCOSYLATED A1C: CPT

## 2023-10-31 PROCEDURE — 82746 ASSAY OF FOLIC ACID SERUM: CPT

## 2023-10-31 PROCEDURE — 83921 ORGANIC ACID SINGLE QUANT: CPT

## 2023-10-31 PROCEDURE — 36415 COLL VENOUS BLD VENIPUNCTURE: CPT

## 2023-10-31 PROCEDURE — 82306 VITAMIN D 25 HYDROXY: CPT

## 2023-10-31 PROCEDURE — 94010 BREATHING CAPACITY TEST: CPT | Performed by: INTERNAL MEDICINE

## 2023-10-31 PROCEDURE — 82607 VITAMIN B-12: CPT

## 2023-10-31 PROCEDURE — 84443 ASSAY THYROID STIM HORMONE: CPT

## 2023-10-31 ASSESSMENT — PULMONARY FUNCTION TESTS
FVC: 3.67
FEV1: 3.03
FEV1/FVC_PREDICTED: 79
FVC_PERCENT_PREDICTED_PRE: 97
FEV1_PREDICTED: 2.95
FEV1/FVC: 82
FEV1/FVC_PERCENT_PREDICTED_PRE: 104
FVC_PREDICTED: 3.76
FEV1_PERCENT_PREDICTED_PRE: 102

## 2023-10-31 NOTE — PROGRESS NOTES
Patient to follow up with physician in 3 months. Patient will get a repeat CT Chest prior to next office visit.

## 2023-10-31 NOTE — PATIENT INSTRUCTIONS
[address] [facility], [address] [facility]  Office: [phone]      Your were seen in the office today for  abnormal CT images      We  did not make changes to your medications today. Testing ordered today was CT scan of the chest due early Jan 2024. Vaccines recommended Influenza              Please do not hesitate to call the office with any questions.

## 2023-11-01 ENCOUNTER — PATIENT MESSAGE (OUTPATIENT)
Dept: FAMILY MEDICINE CLINIC | Age: 58
End: 2023-11-01

## 2023-11-01 DIAGNOSIS — K58.2 IRRITABLE BOWEL SYNDROME WITH BOTH CONSTIPATION AND DIARRHEA: Primary | ICD-10-CM

## 2023-11-01 DIAGNOSIS — K21.9 GASTROESOPHAGEAL REFLUX DISEASE WITHOUT ESOPHAGITIS: ICD-10-CM

## 2023-11-01 DIAGNOSIS — E72.11 MTHFR (METHYLENE THF REDUCTASE) DEFICIENCY AND HOMOCYSTINURIA (HCC): ICD-10-CM

## 2023-11-01 DIAGNOSIS — E72.12 MTHFR (METHYLENE THF REDUCTASE) DEFICIENCY AND HOMOCYSTINURIA (HCC): ICD-10-CM

## 2023-11-01 DIAGNOSIS — Z12.11 SCREENING FOR COLON CANCER: ICD-10-CM

## 2023-11-01 DIAGNOSIS — N32.89 BLADDER WALL THICKENING: ICD-10-CM

## 2023-11-05 LAB — METHYLMALONATE SERPL-SCNC: 0.26 UMOL/L (ref 0–0.4)

## 2023-11-06 NOTE — TELEPHONE ENCOUNTER
From: John Johnson  Sent: 11/3/2023 5:47 PM EDT  To: [facility]io [place]  Clinical Staff  Subject: Isaura Mooney,  Thank you for your recommendations to help resolve these health concerns found in the blood tests. I didn't see any Homocysteine test results listed in my chart, as we discussed checking the status of my MTHFR disorder. I did go over the results of my Kidney tests with Dr. Ti Lorenzo and he stated that I'm Stage 2 CKD with his findings. He reassured me that I can relax for now :) I will continue to follow up with him to see if that changes in the future. Also, he suggested that I get a referral to see a Urologist since they found 4.6 thickening in my Bladder on the Ultrasound. I did an [facility] Provider lookup and found Dr. Dannie Colvin MD located in [facility] that I'd like to get an assessment from. The office is [facility] [place]/ [phone]. Thank you in advance. Lastly I'm sorry for the confusion, however I've changed my mind about seeing Dr. Jennifer Avila Gastroenterologist for my Colonoscopy & EDG that you requested now that he is in the [facility]. I did another [facility] Provider lookup and would greatly appreciate & prefer the referral to Dr. Meli Blancas/ Gastroenterologist and [facility] [address] [place]/ [phone].

## 2023-11-07 NOTE — TELEPHONE ENCOUNTER
1. Irritable bowel syndrome with both constipation and diarrhea  -     External Referral To Gastroenterology  2. Gastroesophageal reflux disease without esophagitis  -     External Referral To Gastroenterology  3. Screening for colon cancer  -     External Referral To Gastroenterology  4. Bladder wall thickening  -     External Referral To Urology  5. MTHFR (methylene THF reductase) deficiency and homocystinuria (HCC)  -     Homocysteine;  Future

## 2023-12-14 ENCOUNTER — TELEPHONE (OUTPATIENT)
Dept: FAMILY MEDICINE CLINIC | Age: 58
End: 2023-12-14

## 2023-12-14 DIAGNOSIS — M62.838 MUSCLE SPASM: Primary | ICD-10-CM

## 2023-12-14 NOTE — TELEPHONE ENCOUNTER
Pt is asking for her muscle relaxor, she has not taken for awhile, but has started working out and is in need of it, send to [facility]

## 2023-12-15 RX ORDER — CYCLOBENZAPRINE HCL 5 MG
5 TABLET ORAL 3 TIMES DAILY PRN
Qty: 30 TABLET | Refills: 1 | Status: SHIPPED | OUTPATIENT
Start: 2023-12-15

## 2024-01-05 ENCOUNTER — TELEPHONE (OUTPATIENT)
Dept: FAMILY MEDICINE CLINIC | Age: 59
End: 2024-01-05

## 2024-01-05 NOTE — TELEPHONE ENCOUNTER
----- Message from Ricky Oviedo sent at 1/5/2024 11:47 AM EST -----  Subject: Referral Request    Reason for referral request? GI  Provider patient wants to be referred to(if known):     Provider Phone Number(if known):[phone]    Additional Information for Provider? Patient would like to see Alisson Faustin. Please advise.  ---------------------------------------------------------------------------  --------------  CALL BACK INFO    [phone]; OK to leave message on voicemail  ---------------------------------------------------------------------------  --------------

## 2024-01-05 NOTE — TELEPHONE ENCOUNTER
----- Message from Ricky Oviedo sent at 1/5/2024 11:45 AM EST -----  Subject: Message to Provider    QUESTIONS  Information for Provider? Patient is calling because she would like to   speak with Dr. Medrano. She informed that she was referred to a   Gastroenterologist named Dr. Talia Blancas and the office informed her   that she no longer is there. Patient would like to know if Dr. Medrano knew   where that provider is currently working and the office would not tell   her. Please advise.  ---------------------------------------------------------------------------  --------------  CALL BACK INFO  [phone]; OK to leave message on voicemail  ---------------------------------------------------------------------------  --------------  SCRIPT ANSWERS  Relationship to Patient? Self

## 2024-01-11 NOTE — TELEPHONE ENCOUNTER
Patient contacted. Patient would like to see Dr. Blancas. Please send referral to [facility].

## 2024-01-11 NOTE — TELEPHONE ENCOUNTER
Please send this referral, it is still open in the system to Purvi Blancas, looks like she is with [facility] now, call her and make sure she is with [facility] before you send it

## 2024-01-16 ENCOUNTER — TELEPHONE (OUTPATIENT)
Dept: PULMONOLOGY | Age: 59
End: 2024-01-16

## 2024-01-16 NOTE — TELEPHONE ENCOUNTER
Reymundo was called to initiate a Prior Authorization for the patient's ordered CT Chest by Dr. Chew.  Authorization was received with the authorization number of [id number].  It is good for 1/16/24 - 2/14/24.

## 2024-02-06 ENCOUNTER — OFFICE VISIT (OUTPATIENT)
Dept: PULMONOLOGY | Age: 59
End: 2024-02-06
Payer: COMMERCIAL

## 2024-02-06 VITALS
HEIGHT: 68 IN | TEMPERATURE: 97 F | RESPIRATION RATE: 18 BRPM | DIASTOLIC BLOOD PRESSURE: 80 MMHG | OXYGEN SATURATION: 99 % | SYSTOLIC BLOOD PRESSURE: 132 MMHG | WEIGHT: 212 LBS | BODY MASS INDEX: 32.13 KG/M2 | HEART RATE: 97 BPM

## 2024-02-06 DIAGNOSIS — R91.1 LUNG NODULE: Primary | ICD-10-CM

## 2024-02-06 DIAGNOSIS — J92.9 PLEURAL THICKENING: ICD-10-CM

## 2024-02-06 PROCEDURE — 99212 OFFICE O/P EST SF 10 MIN: CPT | Performed by: INTERNAL MEDICINE

## 2024-02-06 NOTE — PROGRESS NOTES
[facility]     HISTORY OF PRESENT ILLNESS:    Toya Onofre is a 58 y.o. year old female here for evaluation of pulmonary nodules. Patient seen and examined in clinic. She is seeing Dr. Mims at [facility] due to history of breast cancer.  Breast cancer was HER2+, she was treated with surgery, tamoxifen/anasaterole x 3 years. She aslo had radiation to her lymph nodes. She had a CT of the chest on 10/3/2023 which showed a 4.5mm noncalcified RLL pulmonary nodule and a 3.3 mm right lower lobe nodule,  a 7mm right superior interlobar lymph node and interlobar lymph node were present.     She has a history of a PE in 2004.     She is very active and goes to [facility] 3 x a week. She denies symptoms of shortness of breath. She previously smoked but quit many years ago.     Updated HPI as of February 6, 2024:  Patient seen and examined.  She did have her repeat CT of the chest at [facility].  Unfortunately we do not currently have this imaging available.  At this time she denies any increased pulmonary symptoms.  Denies any coughing, wheezing, or shortness of breath.  She is continuing to follow-up with oncology.      ALLERGIES:    Allergies   Allergen Reactions    Bactrim [Sulfamethoxazole-Trimethoprim] Hives and Itching    Ethinyl Estradiol     Levonorgestrel     Sulfamethoxazole Hives    Trimethoprim Hives    Adhesive Tape Rash    Pcn [Penicillins] Nausea And Vomiting       PAST MEDICAL HISTORY:       Diagnosis Date    Breast cancer (HCC) 02/2017    right     Breast disorder     FIBROCYSTIC    Cancer (HCC)     GERD (gastroesophageal reflux disease)     IBS (irritable bowel syndrome)     MTHFR (methylene THF reductase) deficiency and homocystinuria (HCC)     Pulmonary embolism (HCC) 2004    during pregnancy       MEDICATIONS:   Current Outpatient Medications   Medication Sig Dispense Refill    cyclobenzaprine (FLEXERIL) 5 MG tablet Take

## 2024-03-26 ENCOUNTER — COMMUNITY OUTREACH (OUTPATIENT)
Dept: FAMILY MEDICINE CLINIC | Age: 59
End: 2024-03-26

## 2024-04-16 ENCOUNTER — OFFICE VISIT (OUTPATIENT)
Dept: FAMILY MEDICINE CLINIC | Age: 59
End: 2024-04-16
Payer: COMMERCIAL

## 2024-04-16 VITALS
OXYGEN SATURATION: 98 % | DIASTOLIC BLOOD PRESSURE: 83 MMHG | BODY MASS INDEX: 32.58 KG/M2 | RESPIRATION RATE: 20 BRPM | HEART RATE: 87 BPM | SYSTOLIC BLOOD PRESSURE: 133 MMHG | HEIGHT: 68 IN | WEIGHT: 215 LBS | TEMPERATURE: 96.7 F

## 2024-04-16 DIAGNOSIS — E72.11 MTHFR (METHYLENE THF REDUCTASE) DEFICIENCY AND HOMOCYSTINURIA (HCC): ICD-10-CM

## 2024-04-16 DIAGNOSIS — E66.09 CLASS 1 OBESITY DUE TO EXCESS CALORIES WITH SERIOUS COMORBIDITY AND BODY MASS INDEX (BMI) OF 33.0 TO 33.9 IN ADULT: Primary | ICD-10-CM

## 2024-04-16 DIAGNOSIS — E78.00 PURE HYPERCHOLESTEROLEMIA: ICD-10-CM

## 2024-04-16 DIAGNOSIS — R73.09 ELEVATED HEMOGLOBIN A1C: ICD-10-CM

## 2024-04-16 DIAGNOSIS — R53.83 FATIGUE, UNSPECIFIED TYPE: ICD-10-CM

## 2024-04-16 DIAGNOSIS — J84.10 LUNG FIBROSIS (HCC): ICD-10-CM

## 2024-04-16 DIAGNOSIS — Z86.711 HISTORY OF PULMONARY EMBOLISM: ICD-10-CM

## 2024-04-16 DIAGNOSIS — Z90.11 HISTORY OF RIGHT MASTECTOMY: ICD-10-CM

## 2024-04-16 DIAGNOSIS — E72.12 MTHFR (METHYLENE THF REDUCTASE) DEFICIENCY AND HOMOCYSTINURIA (HCC): ICD-10-CM

## 2024-04-16 DIAGNOSIS — E55.9 VITAMIN D INSUFFICIENCY: ICD-10-CM

## 2024-04-16 DIAGNOSIS — E53.9 VITAMIN B DEFICIENCY: ICD-10-CM

## 2024-04-16 DIAGNOSIS — N18.31 STAGE 3A CHRONIC KIDNEY DISEASE (HCC): ICD-10-CM

## 2024-04-16 LAB — HBA1C MFR BLD: 5.1 %

## 2024-04-16 PROCEDURE — G2211 COMPLEX E/M VISIT ADD ON: HCPCS | Performed by: STUDENT IN AN ORGANIZED HEALTH CARE EDUCATION/TRAINING PROGRAM

## 2024-04-16 PROCEDURE — 83036 HEMOGLOBIN GLYCOSYLATED A1C: CPT | Performed by: STUDENT IN AN ORGANIZED HEALTH CARE EDUCATION/TRAINING PROGRAM

## 2024-04-16 PROCEDURE — 99214 OFFICE O/P EST MOD 30 MIN: CPT | Performed by: STUDENT IN AN ORGANIZED HEALTH CARE EDUCATION/TRAINING PROGRAM

## 2024-04-16 RX ORDER — PHENTERMINE HYDROCHLORIDE 37.5 MG/1
37.5 TABLET ORAL
Qty: 30 TABLET | Refills: 0 | Status: SHIPPED | OUTPATIENT
Start: 2024-04-16 | End: 2024-05-16

## 2024-04-16 ASSESSMENT — PATIENT HEALTH QUESTIONNAIRE - PHQ9
SUM OF ALL RESPONSES TO PHQ QUESTIONS 1-9: 0
1. LITTLE INTEREST OR PLEASURE IN DOING THINGS: NOT AT ALL
SUM OF ALL RESPONSES TO PHQ QUESTIONS 1-9: 0
2. FEELING DOWN, DEPRESSED OR HOPELESS: NOT AT ALL
SUM OF ALL RESPONSES TO PHQ9 QUESTIONS 1 & 2: 0
SUM OF ALL RESPONSES TO PHQ QUESTIONS 1-9: 0
SUM OF ALL RESPONSES TO PHQ QUESTIONS 1-9: 0

## 2024-04-16 ASSESSMENT — ENCOUNTER SYMPTOMS
WHEEZING: 0
ABDOMINAL DISTENTION: 0
SHORTNESS OF BREATH: 0
COUGH: 0
ABDOMINAL PAIN: 0

## 2024-04-16 NOTE — PROGRESS NOTES
and are negative.         Objective   /83   Pulse 87   Temp (!) 96.7 °F (35.9 °C) (Temporal)   Resp 20   Ht 1.727 m (5' 7.99\")   Wt 97.5 kg (215 lb)   LMP 09/09/2016   SpO2 98%   BMI 32.70 kg/m²       Lab Results   Component Value Date    LABA1C 5.7 (H) 10/31/2023    LABA1C 5.6 09/21/2022    LABA1C 4.9 02/10/2017     Lab Results   Component Value Date    CHOL 216 (H) 10/31/2023    CHOL 206 (H) 09/21/2022    CHOL 169 09/10/2021     Lab Results   Component Value Date    TRIG 100 10/31/2023    TRIG 91 09/21/2022    TRIG 99 09/10/2021     Lab Results   Component Value Date    HDL 68 10/31/2023    HDL 73 09/21/2022    HDL 59 09/10/2021     Lab Results   Component Value Date    LDLCHOLESTEROL 128 (H) 10/31/2023    LDLCALC 115 (H) 09/21/2022    LDLCALC 90 09/10/2021    LDLCALC 125 (H) 10/06/2020     Lab Results   Component Value Date    VLDL 20 10/31/2023     No results found for: \"CHOLHDLRATIO\"   Creatinine   Date Value Ref Range Status   10/12/2023 1.0 0.50 - 1.00 mg/dL Final   09/21/2022 1.1 (H) 0.5 - 1.0 mg/dL Final   09/10/2021 1.0 0.5 - 1.0 mg/dL Final       The 10-year ASCVD risk score (Rachel ROBLERO, et al., 2019) is: 2.1%    Values used to calculate the score:      Age: 58 years      Sex: Female      Is Non- : No      Diabetic: No      Tobacco smoker: No      Systolic Blood Pressure: 133 mmHg      Is BP treated: No      HDL Cholesterol: 68 mg/dL      Total Cholesterol: 216 mg/dL     Physical Exam  Constitutional:       General: She is not in acute distress.     Appearance: Normal appearance.   HENT:      Head: Normocephalic and atraumatic.      Right Ear: External ear normal.      Left Ear: External ear normal.      Nose: Nose normal.      Mouth/Throat:      Mouth: Mucous membranes are moist.   Eyes:      Extraocular Movements: Extraocular movements intact.      Conjunctiva/sclera: Conjunctivae normal.   Cardiovascular:      Rate and Rhythm: Normal rate and regular rhythm.